# Patient Record
Sex: FEMALE | Race: WHITE | NOT HISPANIC OR LATINO | Employment: FULL TIME | URBAN - METROPOLITAN AREA
[De-identification: names, ages, dates, MRNs, and addresses within clinical notes are randomized per-mention and may not be internally consistent; named-entity substitution may affect disease eponyms.]

---

## 2017-03-07 RX ORDER — SIMVASTATIN 20 MG
TABLET ORAL
Qty: 90 TABLET | Refills: 0 | Status: SHIPPED | OUTPATIENT
Start: 2017-03-07 | End: 2017-03-27

## 2017-03-23 RX ORDER — CETIRIZINE HYDROCHLORIDE 10 MG/1
TABLET ORAL
Qty: 90 TABLET | Refills: 0 | Status: SHIPPED | OUTPATIENT
Start: 2017-03-23 | End: 2017-06-21 | Stop reason: SDUPTHER

## 2017-03-27 ENCOUNTER — OFFICE VISIT (OUTPATIENT)
Dept: FAMILY MEDICINE CLINIC | Facility: CLINIC | Age: 62
End: 2017-03-27

## 2017-03-27 VITALS
BODY MASS INDEX: 24.92 KG/M2 | SYSTOLIC BLOOD PRESSURE: 126 MMHG | WEIGHT: 132 LBS | TEMPERATURE: 98.3 F | HEIGHT: 61 IN | OXYGEN SATURATION: 98 % | DIASTOLIC BLOOD PRESSURE: 80 MMHG | HEART RATE: 96 BPM

## 2017-03-27 DIAGNOSIS — M85.80 OSTEOPENIA: ICD-10-CM

## 2017-03-27 DIAGNOSIS — E78.00 PURE HYPERCHOLESTEROLEMIA: ICD-10-CM

## 2017-03-27 DIAGNOSIS — Z00.00 PHYSICAL EXAM: Primary | ICD-10-CM

## 2017-03-27 DIAGNOSIS — Z13.820 SCREENING FOR OSTEOPOROSIS: ICD-10-CM

## 2017-03-27 LAB
25(OH)D3 SERPL-MCNC: 31.4 NG/ML
ARTICHOKE IGE QN: 155 MG/DL (ref 0–130)
BILIRUB BLD-MCNC: NEGATIVE MG/DL
CHOLEST SERPL-MCNC: 243 MG/DL (ref 0–200)
CLARITY, POC: CLEAR
COLOR UR: YELLOW
GLUCOSE UR STRIP-MCNC: NEGATIVE MG/DL
HDLC SERPL-MCNC: 73 MG/DL (ref 40–60)
KETONES UR QL: NEGATIVE
LEUKOCYTE EST, POC: NEGATIVE
NITRITE UR-MCNC: NEGATIVE MG/ML
PH UR: 7 [PH] (ref 5–8)
PROT UR STRIP-MCNC: NEGATIVE MG/DL
RBC # UR STRIP: NEGATIVE /UL
SP GR UR: 1.01 (ref 1–1.03)
TRIGL SERPL-MCNC: 95 MG/DL (ref 0–150)
UROBILINOGEN UR QL: NORMAL

## 2017-03-27 PROCEDURE — 81003 URINALYSIS AUTO W/O SCOPE: CPT | Performed by: FAMILY MEDICINE

## 2017-03-27 PROCEDURE — 99396 PREV VISIT EST AGE 40-64: CPT | Performed by: FAMILY MEDICINE

## 2017-03-27 PROCEDURE — 36415 COLL VENOUS BLD VENIPUNCTURE: CPT | Performed by: FAMILY MEDICINE

## 2017-03-27 PROCEDURE — 82306 VITAMIN D 25 HYDROXY: CPT | Performed by: FAMILY MEDICINE

## 2017-03-27 PROCEDURE — 80061 LIPID PANEL: CPT | Performed by: FAMILY MEDICINE

## 2017-03-27 RX ORDER — UBIDECARENONE 100 MG
100 CAPSULE ORAL DAILY
COMMUNITY
End: 2018-10-25 | Stop reason: SDUPTHER

## 2017-03-27 NOTE — PROGRESS NOTES
Subjective   Ester Hernandez is a 61 y.o. female.     History of Present Illness   The patient is here for a physical exam.    She states she is doing well.  Denies any chest pain or shortness of breath.    She is employed.  Does not smoke.  Denies alcohol or drug use.    Sees her eye doctor annually.  Sees her dentist twice a year.    Sees a GYN for pap and mammograms.  Sees Dr Raza for Dermatology.    Colonoscopy in 2013 with Dr Arellano. Diverticulosis and to repeat next year.    Immunizations are up to date.      The following portions of the patient's history were reviewed and updated as appropriate: allergies, current medications, past family history, past medical history, past social history, past surgical history and problem list.    Review of Systems   Constitutional: Negative.    HENT: Negative.    Respiratory: Negative.    Cardiovascular: Negative.    Gastrointestinal: Negative.    Endocrine: Negative.    Genitourinary: Negative.    Musculoskeletal: Negative.    Skin: Negative.    Allergic/Immunologic: Positive for environmental allergies (seasonal). Negative for food allergies and immunocompromised state.   Neurological: Negative.    Hematological: Negative.    Psychiatric/Behavioral: Positive for sleep disturbance (insomnia). Negative for agitation, behavioral problems, confusion, decreased concentration, dysphoric mood, hallucinations, self-injury and suicidal ideas. The patient is not nervous/anxious and is not hyperactive.        Objective   Physical Exam   Constitutional: She is oriented to person, place, and time. She appears well-developed and well-nourished. No distress.   HENT:   Head: Normocephalic and atraumatic.   Right Ear: External ear normal.   Left Ear: External ear normal.   Nose: Nose normal.   Mouth/Throat: Oropharynx is clear and moist.   Eyes: Conjunctivae and EOM are normal. Pupils are equal, round, and reactive to light.   Neck: Normal range of motion. Neck supple. No thyromegaly  present.   Cardiovascular: Normal rate, regular rhythm, normal heart sounds and intact distal pulses.    Pulmonary/Chest: Effort normal and breath sounds normal. No respiratory distress. She has no wheezes. She has no rales.   Abdominal: Soft. Bowel sounds are normal. There is no tenderness.   Musculoskeletal: Normal range of motion.   Neurological: She is alert and oriented to person, place, and time. She has normal reflexes.   Skin: Skin is warm and dry. She is not diaphoretic.   Psychiatric: She has a normal mood and affect. Her behavior is normal. Judgment and thought content normal.   Nursing note and vitals reviewed.      Assessment/Plan   Ester was seen today for annual exam.    Diagnoses and all orders for this visit:    Physical exam  -     POCT urinalysis dipstick, automated  -     Lipid Panel  -     Vitamin D 25 Hydroxy    Screening for osteoporosis  -     DEXA Bone Density Axial; Future    Pure hypercholesterolemia    Osteopenia  -     Vitamin D 25 Hydroxy  -     DEXA Bone Density Axial; Future                Drink plenty fluids.    Increase the Vitamin D to 2000 IU daily. Discontinue the Caltrate.    Restart the Melatonin 3 mg at bed time.    Continue other medications as doing.    Check a UA, Lipids, and Vitamin D level. Report results by letter. If her cholesterol levels are still very high, then we will start the rosuvastatin 5 mg a day. If they are improved, we may not. Report those results by letter. Should re-restart the rosuvastatin. Return to see me 2 months after that to check lipids and liver. If not, return to see us in 6 months.    Scribed for Dr Judd Aguirre by Margret Jimenez CMA.    I, Judd Aguirre MD, personally performed the services described in this documentation, as scribed by Margret Jimenez in my presence, and is both accurate and complete.      Follow up in 6 months sooner if needed.

## 2017-06-05 RX ORDER — SIMVASTATIN 20 MG
TABLET ORAL
Qty: 90 TABLET | Refills: 0 | Status: SHIPPED | OUTPATIENT
Start: 2017-06-05 | End: 2018-01-11 | Stop reason: ALTCHOICE

## 2017-06-21 RX ORDER — CETIRIZINE HYDROCHLORIDE 10 MG/1
TABLET ORAL
Qty: 90 TABLET | Refills: 0 | Status: SHIPPED | OUTPATIENT
Start: 2017-06-21 | End: 2018-02-19 | Stop reason: SDUPTHER

## 2017-09-21 ENCOUNTER — TRANSCRIBE ORDERS (OUTPATIENT)
Dept: ADMINISTRATIVE | Facility: HOSPITAL | Age: 62
End: 2017-09-21

## 2017-09-21 DIAGNOSIS — Z12.31 VISIT FOR SCREENING MAMMOGRAM: Primary | ICD-10-CM

## 2017-10-09 ENCOUNTER — HOSPITAL ENCOUNTER (OUTPATIENT)
Dept: MAMMOGRAPHY | Facility: HOSPITAL | Age: 62
Discharge: HOME | End: 2017-10-09
Attending: SURGERY | Admitting: SURGERY

## 2017-10-09 DIAGNOSIS — Z12.31 VISIT FOR SCREENING MAMMOGRAM: ICD-10-CM

## 2017-10-09 PROCEDURE — 77067 SCR MAMMO BI INCL CAD: CPT | Performed by: RADIOLOGY

## 2017-10-09 PROCEDURE — 77063 BREAST TOMOSYNTHESIS BI: CPT | Performed by: RADIOLOGY

## 2017-10-09 PROCEDURE — 77063 BREAST TOMOSYNTHESIS BI: CPT

## 2017-10-09 PROCEDURE — G0202 SCR MAMMO BI INCL CAD: HCPCS

## 2017-10-18 ENCOUNTER — HOSPITAL ENCOUNTER (OUTPATIENT)
Dept: MAMMOGRAPHY | Facility: HOSPITAL | Age: 62
Discharge: HOME | End: 2017-10-18
Admitting: SURGERY

## 2017-10-18 DIAGNOSIS — R92.8 ABNORMAL MAMMOGRAM: ICD-10-CM

## 2017-10-18 PROCEDURE — G0279 TOMOSYNTHESIS, MAMMO: HCPCS

## 2017-10-18 PROCEDURE — 77065 DX MAMMO INCL CAD UNI: CPT | Performed by: RADIOLOGY

## 2017-10-18 PROCEDURE — G0206 DX MAMMO INCL CAD UNI: HCPCS

## 2017-10-18 PROCEDURE — 77061 BREAST TOMOSYNTHESIS UNI: CPT | Performed by: RADIOLOGY

## 2018-01-03 ENCOUNTER — HOSPITAL ENCOUNTER (EMERGENCY)
Facility: HOSPITAL | Age: 63
Discharge: HOME | End: 2018-01-03
Attending: EMERGENCY MEDICINE | Admitting: EMERGENCY MEDICINE

## 2018-01-03 VITALS
RESPIRATION RATE: 14 BRPM | WEIGHT: 135 LBS | HEART RATE: 66 BPM | OXYGEN SATURATION: 97 % | DIASTOLIC BLOOD PRESSURE: 99 MMHG | TEMPERATURE: 97.8 F | BODY MASS INDEX: 25.49 KG/M2 | HEIGHT: 61 IN | SYSTOLIC BLOOD PRESSURE: 162 MMHG

## 2018-01-03 DIAGNOSIS — I10 HYPERTENSION, UNSPECIFIED TYPE: Primary | ICD-10-CM

## 2018-01-03 LAB
ANION GAP SERPL CALCULATED.3IONS-SCNC: 10 MMOL/L (ref 3–11)
BILIRUB UR QL STRIP: NEGATIVE
BUN BLD-MCNC: 13 MG/DL (ref 9–23)
BUN/CREAT SERPL: 18.6 (ref 7–25)
CALCIUM SPEC-SCNC: 9.3 MG/DL (ref 8.7–10.4)
CHLORIDE SERPL-SCNC: 102 MMOL/L (ref 99–109)
CLARITY UR: CLEAR
CO2 SERPL-SCNC: 30 MMOL/L (ref 20–31)
COLOR UR: YELLOW
CREAT BLD-MCNC: 0.7 MG/DL (ref 0.6–1.3)
GFR SERPL CREATININE-BSD FRML MDRD: 85 ML/MIN/1.73
GLUCOSE BLD-MCNC: 114 MG/DL (ref 70–100)
GLUCOSE UR STRIP-MCNC: NEGATIVE MG/DL
HGB UR QL STRIP.AUTO: NEGATIVE
KETONES UR QL STRIP: NEGATIVE
LEUKOCYTE ESTERASE UR QL STRIP.AUTO: NEGATIVE
NITRITE UR QL STRIP: NEGATIVE
PH UR STRIP.AUTO: 7.5 [PH] (ref 5–8)
POTASSIUM BLD-SCNC: 3.8 MMOL/L (ref 3.5–5.5)
PROT UR QL STRIP: NEGATIVE
SODIUM BLD-SCNC: 142 MMOL/L (ref 132–146)
SP GR UR STRIP: 1.01 (ref 1–1.03)
UROBILINOGEN UR QL STRIP: NORMAL

## 2018-01-03 PROCEDURE — 99283 EMERGENCY DEPT VISIT LOW MDM: CPT

## 2018-01-03 PROCEDURE — 80048 BASIC METABOLIC PNL TOTAL CA: CPT | Performed by: EMERGENCY MEDICINE

## 2018-01-03 PROCEDURE — 81003 URINALYSIS AUTO W/O SCOPE: CPT | Performed by: EMERGENCY MEDICINE

## 2018-01-03 RX ORDER — MULTIVITAMIN WITH IRON
1 TABLET ORAL DAILY
COMMUNITY
End: 2019-01-14

## 2018-01-03 RX ORDER — HYDROCHLOROTHIAZIDE 12.5 MG/1
12.5 TABLET ORAL DAILY
Qty: 15 TABLET | Refills: 0 | Status: SHIPPED | OUTPATIENT
Start: 2018-01-03 | End: 2018-01-11 | Stop reason: SDUPTHER

## 2018-01-03 RX ORDER — HYDROCHLOROTHIAZIDE 12.5 MG/1
12.5 TABLET ORAL DAILY
Status: DISCONTINUED | OUTPATIENT
Start: 2018-01-03 | End: 2018-01-03 | Stop reason: HOSPADM

## 2018-01-03 RX ADMIN — HYDROCHLOROTHIAZIDE 12.5 MG: 12.5 TABLET ORAL at 14:10

## 2018-01-03 NOTE — DISCHARGE INSTRUCTIONS
Follow up with your primary medical doctor in one week. Call to make an appointment.    Immediately return to the emergency department for any new or worsening symptoms.

## 2018-01-03 NOTE — ED PROVIDER NOTES
"Subjective   HPI Comments: 62-year-old female presents for evaluation of elevated blood pressure.  She states that she has a history of elevated blood pressure in the past but is not on any current medications as she prefers a more natural and holistic approach.  She went to her dermatologist earlier this morning to be assessed for basal cell and was noted to be hypertensive with a blood pressure 200/100.  She was advised to follow with her primary care physician after calling their office was unable to be evaluated today.  She went to urgent care and was subsequently sent to the ED for evaluation of her elevated blood pressure.  She is currently asymptomatic and without complaint.  She denies any headache or vision changes.  No chest pain.  No shortness of breath.  She states \"I feel great.\"     Patient is a 62 y.o. female presenting with hypertension.   History provided by:  Patient  Hypertension   Severity:  Mild  Onset quality:  Sudden  Duration: Today.  Timing:  Constant  Progression:  Unchanged  Chronicity:  New  Notable PTA blood pressures:  >200/>100  Context: stress    Context comment:  At Dermatologist for procedure  Relieved by:  None tried  Worsened by:  Nothing  Ineffective treatments:  None tried  Associated symptoms: no chest pain, no fever, no headaches, no palpitations and no shortness of breath    Risk factors: family hx of HTN        Review of Systems   Constitutional: Negative for chills, diaphoresis and fever.   Respiratory: Negative for shortness of breath.    Cardiovascular: Negative for chest pain and palpitations.   Neurological: Negative for headaches.   All other systems reviewed and are negative.      Past Medical History:   Diagnosis Date   • Abdominal pain    • Acute URI    • Breast cancer 2004    right   • Breast cancer    • Cyst of left upper eyelid    • Hx of radiation therapy    • Immunization deficiency    • Radiation 2004    right   • Seasonal allergies    • Sinusitis  "       Allergies   Allergen Reactions   • Amoxicillin    • Azithromycin    • Cephalexin    • Ciprofloxacin    • Clarithromycin    • Doxycycline    • Doxycycline Monohydrate    • Erythromycin    • Levofloxacin    • Loratadine        Past Surgical History:   Procedure Laterality Date   • BREAST BIOPSY Right 2004    stereo bx   • BREAST LUMPECTOMY Right 2004   • BREAST SURGERY     • HYSTERECTOMY  2004       Family History   Problem Relation Age of Onset   • Lung cancer Mother    • Hypertension Father    • Diabetes Father    • COPD Brother        Social History     Social History   • Marital status:      Spouse name: N/A   • Number of children: N/A   • Years of education: N/A     Social History Main Topics   • Smoking status: Never Smoker   • Smokeless tobacco: Never Used   • Alcohol use No   • Drug use: No   • Sexual activity: Yes     Partners: Male     Birth control/ protection: None      Comment:      Other Topics Concern   • Not on file     Social History Narrative         Objective   Physical Exam   Constitutional: She is oriented to person, place, and time. She appears well-developed and well-nourished. No distress.   Well-appearing female in no acute distress   HENT:   Head: Normocephalic and atraumatic.   Mouth/Throat: Oropharynx is clear and moist.   Eyes: EOM are normal. Pupils are equal, round, and reactive to light.   Neck: Normal range of motion. Neck supple.   Cardiovascular: Normal rate, regular rhythm and normal heart sounds.  Exam reveals no gallop and no friction rub.    No murmur heard.  Pulmonary/Chest: Effort normal and breath sounds normal. No respiratory distress. She has no wheezes. She has no rales.   Abdominal: Soft. Bowel sounds are normal. She exhibits no distension and no mass. There is no tenderness. There is no rebound and no guarding.   Musculoskeletal: Normal range of motion. She exhibits no edema.   Neurological: She is alert and oriented to person, place, and time.   Skin:  Skin is warm and dry. No rash noted. She is not diaphoretic. No erythema.   Psychiatric: She has a normal mood and affect. Judgment and thought content normal.   Nursing note and vitals reviewed.      Procedures         ED Course  ED Course   Comment By Time   62-year-old female presents for evaluation of elevated blood pressure.  She states that she has a history of elevated blood pressure but is not on any medications as she prefers a more natural approach and does not like medicines.  She was noted to be hypertensive at a dermatologist appointment earlier today and was advised to follow-up with her primary care physician.  However, after being unable to get in with her PCP today, she went to an urgent care and was advised to come to the ED for evaluation.  On arrival to the ED, patient hypertensive but otherwise well-appearing with unremarkable exam.  She is completely asymptomatic.  We will obtain labs to assess for end organ damage and reassess. Genaro Wu MD 01/03 6963   Labs unrevealing with no evidence of end organ damage.  No indication to emergently lower the patient's blood pressure per [facility] clinical policy on asymptomatic hypertension as her clinical presentation certainly is not consistent with hypertensive emergency.  I had a lengthy discussion with the patient regarding blood pressure management and will start her on low dose of HCTZ.  She will follow-up with her primary care physician within one week and keep close tabs on her blood pressure at home in the interim.  Agreeable with plan and given appropriate return precautions. Genaro Wu MD 01/03 3646     No results found for this or any previous visit (from the past 24 hour(s)).  Note: In addition to lab results from this visit, the labs listed above may include labs taken at another facility or during a different encounter within the last 24 hours. Please correlate lab times with ED admission and discharge times for further  "clarification of the services performed during this visit.    No orders to display     Vitals:    01/03/18 1243 01/03/18 1251   BP: (!) 202/110    BP Location: Left arm    Patient Position: Sitting    Pulse: 120 105   Resp: 16    Temp: 97.8 °F (36.6 °C)    TempSrc: Oral    SpO2: 97%    Weight: 61.2 kg (135 lb)    Height: 154.9 cm (61\")      Medications - No data to display  ECG/EMG Results (last 24 hours)     ** No results found for the last 24 hours. **                      Recent Results (from the past 24 hour(s))   Urinalysis With / Culture If Indicated - Urine, Clean Catch    Collection Time: 01/03/18  1:11 PM   Result Value Ref Range    Color, UA Yellow Yellow, Straw    Appearance, UA Clear Clear    pH, UA 7.5 5.0 - 8.0    Specific Gravity, UA 1.007 1.001 - 1.030    Glucose, UA Negative Negative    Ketones, UA Negative Negative    Bilirubin, UA Negative Negative    Blood, UA Negative Negative    Protein, UA Negative Negative    Leuk Esterase, UA Negative Negative    Nitrite, UA Negative Negative    Urobilinogen, UA 0.2 E.U./dL 0.2 - 1.0 E.U./dL   Basic Metabolic Panel    Collection Time: 01/03/18  1:14 PM   Result Value Ref Range    Glucose 114 (H) 70 - 100 mg/dL    BUN 13 9 - 23 mg/dL    Creatinine 0.70 0.60 - 1.30 mg/dL    Sodium 142 132 - 146 mmol/L    Potassium 3.8 3.5 - 5.5 mmol/L    Chloride 102 99 - 109 mmol/L    CO2 30.0 20.0 - 31.0 mmol/L    Calcium 9.3 8.7 - 10.4 mg/dL    eGFR Non African Amer 85 >60 mL/min/1.73    BUN/Creatinine Ratio 18.6 7.0 - 25.0    Anion Gap 10.0 3.0 - 11.0 mmol/L     Note: In addition to lab results from this visit, the labs listed above may include labs taken at another facility or during a different encounter within the last 24 hours. Please correlate lab times with ED admission and discharge times for further clarification of the services performed during this visit.    No orders to display     Vitals:    01/03/18 1243 01/03/18 1251   BP: (!) 202/110    BP Location: Left arm " "   Patient Position: Sitting    Pulse: 120 105   Resp: 16    Temp: 97.8 °F (36.6 °C)    TempSrc: Oral    SpO2: 97%    Weight: 61.2 kg (135 lb)    Height: 154.9 cm (61\")      Medications   hydrochlorothiazide (HYDRODIURIL) tablet 12.5 mg (not administered)     ECG/EMG Results (last 24 hours)     ** No results found for the last 24 hours. **            [facility]    Final diagnoses:   Hypertension, unspecified type       Documentation assistance provided by olesya CONN.  Information recorded by the scribe was done at my direction and has been verified and validated by me.     Frieda Conn  01/03/18 1257       Genaro Wu MD  01/03/18 4464    "

## 2018-01-11 ENCOUNTER — OFFICE VISIT (OUTPATIENT)
Dept: FAMILY MEDICINE CLINIC | Facility: CLINIC | Age: 63
End: 2018-01-11

## 2018-01-11 VITALS
WEIGHT: 132 LBS | DIASTOLIC BLOOD PRESSURE: 92 MMHG | SYSTOLIC BLOOD PRESSURE: 130 MMHG | HEIGHT: 61 IN | HEART RATE: 83 BPM | TEMPERATURE: 98.6 F | BODY MASS INDEX: 24.92 KG/M2 | OXYGEN SATURATION: 98 %

## 2018-01-11 DIAGNOSIS — I10 ESSENTIAL HYPERTENSION: Primary | ICD-10-CM

## 2018-01-11 DIAGNOSIS — Z23 IMMUNIZATION DUE: ICD-10-CM

## 2018-01-11 PROCEDURE — 99213 OFFICE O/P EST LOW 20 MIN: CPT | Performed by: FAMILY MEDICINE

## 2018-01-11 RX ORDER — HYDROCHLOROTHIAZIDE 12.5 MG/1
12.5 TABLET ORAL DAILY
Qty: 30 TABLET | Refills: 5 | Status: SHIPPED | OUTPATIENT
Start: 2018-01-11 | End: 2018-04-16

## 2018-01-11 NOTE — PROGRESS NOTES
"Subjective   Ester Hernandez is a 62 y.o. female.     History of Present Illness   The patient is here for a follow up on Hypertension.    States she is doing well.  Denies any chest pain or shortness of breath.    BP today is 130/92. 142/95 sitting and 144/98 standing on my recheck.  Taking Hctz 12.5 mg daily.    States she went to her dermatologist earlier this morning to be assessed for basal cell and was noted to be hypertensive with a blood pressure 200/100.   She went to the ER and it was 202/110. While waiting it came down to 170/90.  She was given Hctz and it came down even more and she was discharged home.        The following portions of the patient's history were reviewed and updated as appropriate: allergies, current medications, past social history and problem list.    Review of Systems   Respiratory: Negative for shortness of breath.    Cardiovascular: Negative for chest pain.       Objective   /92  Pulse 83  Temp 98.6 °F (37 °C)  Ht 154.9 cm (60.98\")  Wt 59.9 kg (132 lb)  SpO2 98%  BMI 24.95 kg/m2  Physical Exam   Constitutional: She is oriented to person, place, and time. She appears well-developed and well-nourished.   HENT:   Head: Normocephalic.   Eyes: Conjunctivae and EOM are normal. Pupils are equal, round, and reactive to light.   Neck: Normal range of motion. Neck supple. No thyromegaly present.   Cardiovascular: Normal rate, regular rhythm and normal heart sounds.    No murmur heard.  Pulmonary/Chest: Effort normal and breath sounds normal. She has no wheezes. She has no rales.   Musculoskeletal: Normal range of motion.   Lymphadenopathy:     She has no cervical adenopathy.   Neurological: She is alert and oriented to person, place, and time.   Skin: Skin is warm and dry.   Nursing note and vitals reviewed.      Assessment/Plan   Problem List Items Addressed This Visit        Cardiovascular and Mediastinum    Essential hypertension - Primary      Other Visit Diagnoses     " Immunization due                  Drink plenty fluids.    Continue medications as doing.    Monitor BP at home and bring the cuff and readings to next visit.    Given a flu vaccine.    Follow up in 4 weeks.          Scribed for Dr Judd Aguirre by Margret Jimenez CMA.          I, Judd Aguirre MD, personally performed the services described in this documentation, as scribed by Margret Jimenez in my presence, and is both accurate and complete.

## 2018-01-15 PROCEDURE — 90686 IIV4 VACC NO PRSV 0.5 ML IM: CPT | Performed by: FAMILY MEDICINE

## 2018-01-15 PROCEDURE — 90471 IMMUNIZATION ADMIN: CPT | Performed by: FAMILY MEDICINE

## 2018-01-25 ENCOUNTER — OFFICE VISIT (OUTPATIENT)
Dept: FAMILY MEDICINE CLINIC | Facility: CLINIC | Age: 63
End: 2018-01-25

## 2018-01-25 VITALS
SYSTOLIC BLOOD PRESSURE: 134 MMHG | HEART RATE: 84 BPM | DIASTOLIC BLOOD PRESSURE: 86 MMHG | OXYGEN SATURATION: 98 % | TEMPERATURE: 98.8 F | BODY MASS INDEX: 25.3 KG/M2 | HEIGHT: 61 IN | WEIGHT: 134 LBS

## 2018-01-25 DIAGNOSIS — G47.00 INSOMNIA, UNSPECIFIED TYPE: ICD-10-CM

## 2018-01-25 DIAGNOSIS — I10 ESSENTIAL HYPERTENSION: Primary | ICD-10-CM

## 2018-01-25 PROCEDURE — 99214 OFFICE O/P EST MOD 30 MIN: CPT | Performed by: FAMILY MEDICINE

## 2018-01-25 RX ORDER — VENLAFAXINE HYDROCHLORIDE 37.5 MG/1
37.5 TABLET, EXTENDED RELEASE ORAL
Qty: 30 EACH | Refills: 5 | Status: SHIPPED | OUTPATIENT
Start: 2018-01-25 | End: 2018-04-16

## 2018-01-25 RX ORDER — VENLAFAXINE HYDROCHLORIDE 37.5 MG/1
37.5 TABLET, EXTENDED RELEASE ORAL
Qty: 30 EACH | Refills: 5 | Status: SHIPPED | OUTPATIENT
Start: 2018-01-25 | End: 2018-01-25 | Stop reason: SDUPTHER

## 2018-01-25 NOTE — PROGRESS NOTES
"Subjective   Ester Hernandez is a 62 y.o. female.     History of Present Illness   The patient is here for a follow up on Hypertension and Insomnia.    She states she is doing well. Still not sleeping very well.  Denies any chest pain or shortness of breath.    BP today is 136/86. 162/98 on my recheck and 136/88 with her cuff.  Taking Hctz 12.5 mg daily.    States she had a follow up with the Dermatologist and her BP was elevated. States they gave her some Propranolol and it came down.      The following portions of the patient's history were reviewed and updated as appropriate: allergies, current medications, past social history and problem list.    Review of Systems   Respiratory: Negative for shortness of breath.    Cardiovascular: Negative for chest pain.   Psychiatric/Behavioral: Positive for sleep disturbance (Insomnia).       Objective   /86  Pulse 84  Temp 98.8 °F (37.1 °C)  Ht 154.9 cm (60.98\")  Wt 60.8 kg (134 lb)  SpO2 98%  BMI 25.33 kg/m2  Physical Exam   Constitutional: She appears well-developed and well-nourished.   Cardiovascular: Normal rate and regular rhythm.    Pulmonary/Chest: Effort normal and breath sounds normal.   Nursing note and vitals reviewed.      Assessment/Plan   Problem List Items Addressed This Visit        Cardiovascular and Mediastinum    Essential hypertension - Primary       Other    Insomnia              Drink plenty fluids.    Add Venlafaxine ER 37.5 mg daily #30+5.    Continue other medications as doing.    Follow up in April for a physical exam..                I, Judd Aguirre MD, personally performed the services described in this documentation, as scribed by Margret Jimenez in my presence, and is both accurate and complete.        Scribed for Dr Judd Aguirre by Margret Jimenez CMA.    "

## 2018-02-09 ENCOUNTER — TELEPHONE (OUTPATIENT)
Dept: FAMILY MEDICINE CLINIC | Facility: CLINIC | Age: 63
End: 2018-02-09

## 2018-02-09 DIAGNOSIS — G47.00 INSOMNIA, UNSPECIFIED TYPE: Primary | ICD-10-CM

## 2018-02-09 PROBLEM — Z72.820 SLEEP DEPRIVATION: Status: ACTIVE | Noted: 2018-02-09

## 2018-02-09 RX ORDER — TRAZODONE HYDROCHLORIDE 50 MG/1
50 TABLET ORAL NIGHTLY
Qty: 30 TABLET | Refills: 1 | Status: SHIPPED | OUTPATIENT
Start: 2018-02-09 | End: 2018-02-23 | Stop reason: SDUPTHER

## 2018-02-09 NOTE — TELEPHONE ENCOUNTER
I talked to the patient on the telephone.  She is not resting well at night and is experiencing elevation of her blood pressure in the morning.  I want her to start taking trazodone 50 mg each night.  We will call in a prescription.  In addition I wondered to change the timing of her venlafaxine from 37.5 mg at night to 37.5 mg each morning.  Stay on the hydrochlorothiazide at one each morning as doing.  Plan on returning to see us here in the next 1 or 2 weeks during the month of February to recheck her blood pressure and also her rest.

## 2018-02-09 NOTE — TELEPHONE ENCOUNTER
Per dr richardson call in rx for trazadone 50 mg 1 tab qhs for sleep #30 + 1. Pt has been notified.  ----- Message from Leanne Guajardo Rep sent at 2/9/2018  8:10 AM EST -----  Regarding: RX QUESTION  Contact: [phone]  PT WOULD LIKE TO SPEAK TO YOU ABOUT ONE OF THE RX SHE IS TAKING. SHE DOESN'T THINK IT IS WORKING AND WOULD LIKE TO DISCUSS OTHER OPTIONS.

## 2018-02-19 RX ORDER — CETIRIZINE HYDROCHLORIDE 10 MG/1
10 TABLET ORAL DAILY
Qty: 90 TABLET | Refills: 1 | Status: SHIPPED | OUTPATIENT
Start: 2018-02-19 | End: 2018-02-19 | Stop reason: SDUPTHER

## 2018-02-19 RX ORDER — CETIRIZINE HYDROCHLORIDE 10 MG/1
TABLET ORAL
Qty: 90 TABLET | Refills: 0 | Status: SHIPPED | OUTPATIENT
Start: 2018-02-19 | End: 2018-02-22 | Stop reason: SDUPTHER

## 2018-02-22 ENCOUNTER — TELEPHONE (OUTPATIENT)
Dept: FAMILY MEDICINE CLINIC | Facility: CLINIC | Age: 63
End: 2018-02-22

## 2018-02-22 RX ORDER — CETIRIZINE HYDROCHLORIDE 10 MG/1
10 TABLET ORAL DAILY
Qty: 90 TABLET | Refills: 0 | Status: SHIPPED | OUTPATIENT
Start: 2018-02-22 | End: 2020-03-11 | Stop reason: SDUPTHER

## 2018-02-23 ENCOUNTER — OFFICE VISIT (OUTPATIENT)
Dept: FAMILY MEDICINE CLINIC | Facility: CLINIC | Age: 63
End: 2018-02-23

## 2018-02-23 VITALS
DIASTOLIC BLOOD PRESSURE: 74 MMHG | OXYGEN SATURATION: 95 % | WEIGHT: 135 LBS | TEMPERATURE: 98.3 F | HEIGHT: 61 IN | SYSTOLIC BLOOD PRESSURE: 124 MMHG | HEART RATE: 88 BPM | BODY MASS INDEX: 25.49 KG/M2

## 2018-02-23 DIAGNOSIS — G47.00 INSOMNIA, UNSPECIFIED TYPE: ICD-10-CM

## 2018-02-23 DIAGNOSIS — I10 ESSENTIAL HYPERTENSION: Primary | ICD-10-CM

## 2018-02-23 PROCEDURE — 99213 OFFICE O/P EST LOW 20 MIN: CPT | Performed by: FAMILY MEDICINE

## 2018-02-23 RX ORDER — TRAZODONE HYDROCHLORIDE 50 MG/1
100 TABLET ORAL NIGHTLY
Qty: 60 TABLET | Refills: 5 | Status: SHIPPED | OUTPATIENT
Start: 2018-02-23 | End: 2018-11-30 | Stop reason: SINTOL

## 2018-02-23 NOTE — PROGRESS NOTES
"Subjective   Ester Hernandez is a 62 y.o. female.     History of Present Illness   The patient is here for a follow up on Hypertension.    He states he is doing well.  Denies any chest pain or shortness of breath.    BP today is 124/74.  Taking Hctz 12.5 mg daily.    Still not sleeping through the night.      The following portions of the patient's history were reviewed and updated as appropriate: allergies, current medications, past social history and problem list.    Review of Systems   Respiratory: Negative for shortness of breath.    Cardiovascular: Negative for chest pain.   Psychiatric/Behavioral: Positive for sleep disturbance.       Objective   /74  Pulse 88  Temp 98.3 °F (36.8 °C)  Ht 154.9 cm (60.98\")  Wt 61.2 kg (135 lb)  SpO2 95%  BMI 25.52 kg/m2  Physical Exam   Constitutional: She is oriented to person, place, and time. She appears well-developed and well-nourished.   HENT:   Mouth/Throat: Oropharynx is clear and moist.   Eyes: Pupils are equal, round, and reactive to light.   Neck: Normal range of motion. No thyromegaly present.   Cardiovascular: Normal rate, regular rhythm and normal heart sounds.    No murmur heard.  Pulmonary/Chest: Effort normal and breath sounds normal. She has no wheezes. She has no rales.   Neurological: She is alert and oriented to person, place, and time.   Psychiatric: She has a normal mood and affect.   Nursing note and vitals reviewed.      Assessment/Plan   Problem List Items Addressed This Visit        Cardiovascular and Mediastinum    Essential hypertension - Primary       Other    Insomnia              Drink plenty fluids.    Increase trazodone 50 mg to 2 at bedtime. #60+5.    Continue other medications as doing.    Follow up in April for a physical exam.      Scribed for Dr Judd Aguirre by Margret Jimenez CMA.          I, Judd Aguirre MD, personally performed the services described in this documentation, as scribed by Margret Jimneez in my presence, and is " both accurate and complete.

## 2018-04-04 ENCOUNTER — TELEPHONE (OUTPATIENT)
Dept: FAMILY MEDICINE CLINIC | Facility: CLINIC | Age: 63
End: 2018-04-04

## 2018-04-16 ENCOUNTER — OFFICE VISIT (OUTPATIENT)
Dept: FAMILY MEDICINE CLINIC | Facility: CLINIC | Age: 63
End: 2018-04-16

## 2018-04-16 VITALS
TEMPERATURE: 94.7 F | HEART RATE: 81 BPM | OXYGEN SATURATION: 96 % | HEIGHT: 60 IN | WEIGHT: 134 LBS | BODY MASS INDEX: 26.31 KG/M2 | RESPIRATION RATE: 20 BRPM | SYSTOLIC BLOOD PRESSURE: 124 MMHG | DIASTOLIC BLOOD PRESSURE: 84 MMHG

## 2018-04-16 DIAGNOSIS — M85.88 OSTEOPENIA OF SPINE: ICD-10-CM

## 2018-04-16 DIAGNOSIS — I10 ESSENTIAL HYPERTENSION: ICD-10-CM

## 2018-04-16 DIAGNOSIS — Z00.00 WELL ADULT EXAM: Primary | ICD-10-CM

## 2018-04-16 LAB
25(OH)D3 SERPL-MCNC: 27 NG/ML
ALBUMIN SERPL-MCNC: 4.4 G/DL (ref 3.2–4.8)
ALBUMIN/GLOB SERPL: 1.8 G/DL (ref 1.5–2.5)
ALP SERPL-CCNC: 58 U/L (ref 25–100)
ALT SERPL W P-5'-P-CCNC: 18 U/L (ref 7–40)
ANION GAP SERPL CALCULATED.3IONS-SCNC: 5 MMOL/L (ref 3–11)
ARTICHOKE IGE QN: 137 MG/DL (ref 0–130)
AST SERPL-CCNC: 25 U/L (ref 0–33)
BASOPHILS # BLD AUTO: 0.02 10*3/MM3 (ref 0–0.2)
BASOPHILS NFR BLD AUTO: 0.3 % (ref 0–1)
BILIRUB BLD-MCNC: NEGATIVE MG/DL
BILIRUB SERPL-MCNC: 0.4 MG/DL (ref 0.3–1.2)
BUN BLD-MCNC: 16 MG/DL (ref 9–23)
BUN/CREAT SERPL: 22.9 (ref 7–25)
CALCIUM SPEC-SCNC: 9.2 MG/DL (ref 8.7–10.4)
CHLORIDE SERPL-SCNC: 100 MMOL/L (ref 99–109)
CHOLEST SERPL-MCNC: 218 MG/DL (ref 0–200)
CLARITY, POC: CLEAR
CO2 SERPL-SCNC: 32 MMOL/L (ref 20–31)
COLOR UR: YELLOW
CREAT BLD-MCNC: 0.7 MG/DL (ref 0.6–1.3)
DEPRECATED RDW RBC AUTO: 44.2 FL (ref 37–54)
EOSINOPHIL # BLD AUTO: 0.04 10*3/MM3 (ref 0–0.3)
EOSINOPHIL NFR BLD AUTO: 0.6 % (ref 0–3)
ERYTHROCYTE [DISTWIDTH] IN BLOOD BY AUTOMATED COUNT: 13.5 % (ref 11.3–14.5)
EXPIRATION DATE: ABNORMAL
GFR SERPL CREATININE-BSD FRML MDRD: 85 ML/MIN/1.73
GLOBULIN UR ELPH-MCNC: 2.5 GM/DL
GLUCOSE BLD-MCNC: 87 MG/DL (ref 70–100)
GLUCOSE UR STRIP-MCNC: NEGATIVE MG/DL
HCT VFR BLD AUTO: 43.3 % (ref 34.5–44)
HDLC SERPL-MCNC: 71 MG/DL (ref 40–60)
HGB BLD-MCNC: 13.9 G/DL (ref 11.5–15.5)
IMM GRANULOCYTES # BLD: 0.01 10*3/MM3 (ref 0–0.03)
IMM GRANULOCYTES NFR BLD: 0.2 % (ref 0–0.6)
KETONES UR QL: NEGATIVE
LEUKOCYTE EST, POC: NEGATIVE
LYMPHOCYTES # BLD AUTO: 1.4 10*3/MM3 (ref 0.6–4.8)
LYMPHOCYTES NFR BLD AUTO: 22.5 % (ref 24–44)
Lab: ABNORMAL
MCH RBC QN AUTO: 28.5 PG (ref 27–31)
MCHC RBC AUTO-ENTMCNC: 32.1 G/DL (ref 32–36)
MCV RBC AUTO: 88.9 FL (ref 80–99)
MONOCYTES # BLD AUTO: 0.27 10*3/MM3 (ref 0–1)
MONOCYTES NFR BLD AUTO: 4.3 % (ref 0–12)
NEUTROPHILS # BLD AUTO: 4.5 10*3/MM3 (ref 1.5–8.3)
NEUTROPHILS NFR BLD AUTO: 72.3 % (ref 41–71)
NITRITE UR-MCNC: NEGATIVE MG/ML
PH UR: 7 [PH] (ref 5–8)
PLATELET # BLD AUTO: 257 10*3/MM3 (ref 150–450)
PMV BLD AUTO: 11.2 FL (ref 6–12)
POTASSIUM BLD-SCNC: 3.9 MMOL/L (ref 3.5–5.5)
PROT SERPL-MCNC: 6.9 G/DL (ref 5.7–8.2)
PROT UR STRIP-MCNC: NEGATIVE MG/DL
RBC # BLD AUTO: 4.87 10*6/MM3 (ref 3.89–5.14)
RBC # UR STRIP: ABNORMAL /UL
SODIUM BLD-SCNC: 137 MMOL/L (ref 132–146)
SP GR UR: 1.01 (ref 1–1.03)
TRIGL SERPL-MCNC: 71 MG/DL (ref 0–150)
TSH SERPL DL<=0.05 MIU/L-ACNC: 1.86 MIU/ML (ref 0.35–5.35)
UROBILINOGEN UR QL: NORMAL
WBC NRBC COR # BLD: 6.23 10*3/MM3 (ref 3.5–10.8)

## 2018-04-16 PROCEDURE — 81003 URINALYSIS AUTO W/O SCOPE: CPT | Performed by: FAMILY MEDICINE

## 2018-04-16 PROCEDURE — 84443 ASSAY THYROID STIM HORMONE: CPT | Performed by: FAMILY MEDICINE

## 2018-04-16 PROCEDURE — 80053 COMPREHEN METABOLIC PANEL: CPT | Performed by: FAMILY MEDICINE

## 2018-04-16 PROCEDURE — 82306 VITAMIN D 25 HYDROXY: CPT | Performed by: FAMILY MEDICINE

## 2018-04-16 PROCEDURE — 36415 COLL VENOUS BLD VENIPUNCTURE: CPT | Performed by: FAMILY MEDICINE

## 2018-04-16 PROCEDURE — 93000 ELECTROCARDIOGRAM COMPLETE: CPT | Performed by: FAMILY MEDICINE

## 2018-04-16 PROCEDURE — 85025 COMPLETE CBC W/AUTO DIFF WBC: CPT | Performed by: FAMILY MEDICINE

## 2018-04-16 PROCEDURE — 80061 LIPID PANEL: CPT | Performed by: FAMILY MEDICINE

## 2018-04-16 PROCEDURE — 99396 PREV VISIT EST AGE 40-64: CPT | Performed by: FAMILY MEDICINE

## 2018-04-16 NOTE — PROGRESS NOTES
"Subjective   Ester Hernandez is a 62 y.o. female.     History of Present Illness   The patient is here for a physical exam.    States she is doing well.  Denies any chest pain or shortness of breath.    She is employed.  Does not smoke.  Denies alcohol or drug use.    Sees an eye doctor yearly  Sees a dentist twice a year.  Sees Dermatology.  Had a seborrheic keratosis removed from the left breast.    Last colonoscopy 05/16/2013 with Dr Arellano. To repeat this year.    Immunizations are up to date.      The following portions of the patient's history were reviewed and updated as appropriate: allergies, current medications, past social history and problem list.    Review of Systems   Constitutional: Negative.    HENT: Negative.    Eyes: Negative.    Respiratory: Negative.    Cardiovascular: Negative.    Gastrointestinal: Positive for constipation (occasional). Negative for abdominal distention, abdominal pain, anal bleeding, blood in stool, diarrhea, nausea, rectal pain and vomiting.   Endocrine: Negative.    Genitourinary: Negative.    Musculoskeletal: Negative.    Skin: Negative.         Removal of a seborrheic keratosis of the left breast.     Allergic/Immunologic: Positive for environmental allergies (seasonal). Negative for food allergies and immunocompromised state.   Neurological: Negative.    Hematological: Negative.    Psychiatric/Behavioral: Negative.        ECG 12 Lead  Date/Time: 4/22/2018 3:26 PM  Performed by: QUENTIN REDDY  Authorized by: QUENTIN REDDY   Comparison: not compared with previous ECG   Rhythm: sinus rhythm  Rate: normal  BPM: 62  Conduction: conduction normal  ST Segments: ST segments normal  T Waves: T waves normal  QRS axis: normal  Other: no other findings  Clinical impression: normal ECG            Objective   /84   Pulse 81   Temp 94.7 °F (34.8 °C) (Oral)   Resp 20   Ht 152.4 cm (60\")   Wt 60.8 kg (134 lb)   SpO2 96%   BMI 26.17 kg/m²   Physical Exam   Constitutional: " She is oriented to person, place, and time. She appears well-developed and well-nourished. No distress.   HENT:   Head: Normocephalic and atraumatic.   Right Ear: External ear normal.   Left Ear: External ear normal.   Nose: Nose normal.   Mouth/Throat: Oropharynx is clear and moist.   Eyes: Conjunctivae and EOM are normal. Pupils are equal, round, and reactive to light.   Neck: Normal range of motion. Neck supple. No thyromegaly present.   Cardiovascular: Normal rate, regular rhythm, normal heart sounds and intact distal pulses.    Pulmonary/Chest: Effort normal and breath sounds normal. No respiratory distress. She has no wheezes. She has no rales.   Abdominal: Soft. Bowel sounds are normal. There is no tenderness.   Musculoskeletal: Normal range of motion.   Neurological: She is alert and oriented to person, place, and time. She has normal reflexes.   Skin: Skin is warm and dry. She is not diaphoretic.   Psychiatric: She has a normal mood and affect. Her behavior is normal. Judgment and thought content normal.   Nursing note and vitals reviewed.      Assessment/Plan   Problem List Items Addressed This Visit        Cardiovascular and Mediastinum    Essential hypertension       Musculoskeletal and Integument    Osteopenia      Other Visit Diagnoses     Well adult exam    -  Primary    Relevant Orders    POCT urinalysis dipstick, automated (Completed)              Drink plenty fluids.    Increase the Trazodone to 100 mg at night.    Stay off the Venlafaxine and Hctz for now. Restart if needed.    Continue other medications as doing.    Check a UA,CBC,CMP,Lipids,Vitamin D, and TSH. Report results by letter.    Schedule for a bone density. Report results by letter.    Follow up in 1 year. Sooner if needed.        We discussed with the patient the importance of maintaining a healthy weight by observing a diet that is low in carbohydrates.  We also recommended avoiding consumption of high levels of sodium and caffeine  to prevent hypertension. We recommended daily exercise and obtaining a weight with a BMI less than 26.  We also recommended avoiding the use of tobacco and alcohol.  We also recommended an annual physical with their primary care physician.            Scribed for Dr Judd Aguirre by Margret Jimenez CMA.          I, Judd Aguirre MD, personally performed the services described in this documentation, as scribed by Margret Jmienez in my presence, and is both accurate and complete.

## 2018-05-01 ENCOUNTER — APPOINTMENT (OUTPATIENT)
Dept: BONE DENSITY | Facility: HOSPITAL | Age: 63
End: 2018-05-01
Attending: FAMILY MEDICINE

## 2018-05-07 ENCOUNTER — TELEPHONE (OUTPATIENT)
Dept: FAMILY MEDICINE CLINIC | Facility: CLINIC | Age: 63
End: 2018-05-07

## 2018-05-15 ENCOUNTER — TELEPHONE (OUTPATIENT)
Dept: FAMILY MEDICINE CLINIC | Facility: CLINIC | Age: 63
End: 2018-05-15

## 2018-05-15 NOTE — TELEPHONE ENCOUNTER
Patient states she has no received results via mail  ----- Message from Judd Aguirre MD sent at 5/15/2018  9:25 AM EDT -----  Regarding: RE: PHYSICAL LAB RESULTS  Contact: [phone]  Dear Maria R,    I did compose a letter and I believe it was mailed to the patient regarding her laboratory results.  I will bring you a copy of that letter and please contact the patient and see if she is received the letter yet.  If not please mail a copy that I have given you.  Dr. Aguirre  ----- Message -----  From: Maria R Valle MA  Sent: 5/7/2018   1:39 PM  To: Judd Aguirre MD  Subject: FW: PHYSICAL LAB RESULTS                             ----- Message -----  From: Nubia Calderon  Sent: 5/4/2018   5:30 PM  To: Maria R Valle MA, Monse Langley MA  Subject: PHYSICAL LAB RESULTS                             PT CALLED IN TO SAY SHE NEVER RECEIVED LAB RESULTS FROM PHYSICAL. PLEASE CALL WHEN TIME PERMITS.  THANKS,  NUBIA

## 2018-06-06 ENCOUNTER — TELEPHONE (OUTPATIENT)
Dept: FAMILY MEDICINE CLINIC | Facility: CLINIC | Age: 63
End: 2018-06-06

## 2018-06-06 NOTE — TELEPHONE ENCOUNTER
Pt is coming to  her list.    ----- Message from Tricia Izaguirre sent at 6/4/2018  5:25 PM EDT -----  Regarding: note  Contact: [phone]  Patient states  usually writes up a paper stating what all meds she is on and that he recommends she takes them on a script for her reimbursement claims. States the most recent CPX she had done she did not get this. Can  please write this and patient will come by and get it.

## 2018-08-27 ENCOUNTER — TRANSCRIBE ORDERS (OUTPATIENT)
Dept: ADMINISTRATIVE | Facility: HOSPITAL | Age: 63
End: 2018-08-27

## 2018-08-27 DIAGNOSIS — Z12.31 VISIT FOR SCREENING MAMMOGRAM: Primary | ICD-10-CM

## 2018-10-25 ENCOUNTER — OFFICE VISIT (OUTPATIENT)
Dept: FAMILY MEDICINE CLINIC | Facility: CLINIC | Age: 63
End: 2018-10-25

## 2018-10-25 VITALS
TEMPERATURE: 98.3 F | WEIGHT: 132.8 LBS | RESPIRATION RATE: 18 BRPM | HEART RATE: 51 BPM | OXYGEN SATURATION: 97 % | DIASTOLIC BLOOD PRESSURE: 80 MMHG | HEIGHT: 60 IN | BODY MASS INDEX: 26.07 KG/M2 | SYSTOLIC BLOOD PRESSURE: 120 MMHG

## 2018-10-25 DIAGNOSIS — J06.9 ACUTE URI: Primary | ICD-10-CM

## 2018-10-25 DIAGNOSIS — Z48.02 ENCOUNTER FOR REMOVAL OF SUTURES: ICD-10-CM

## 2018-10-25 PROCEDURE — 99213 OFFICE O/P EST LOW 20 MIN: CPT | Performed by: FAMILY MEDICINE

## 2018-10-25 RX ORDER — CLINDAMYCIN HYDROCHLORIDE 300 MG/1
300 CAPSULE ORAL 2 TIMES DAILY
Qty: 10 CAPSULE | Refills: 0 | Status: SHIPPED | OUTPATIENT
Start: 2018-10-25 | End: 2018-12-07

## 2018-10-25 RX ORDER — MELATONIN
1000 DAILY
Qty: 30 TABLET | Refills: 11 | Status: SHIPPED | OUTPATIENT
Start: 2018-10-25 | End: 2019-12-10 | Stop reason: SDUPTHER

## 2018-10-25 RX ORDER — FLUTICASONE PROPIONATE 50 MCG
2 SPRAY, SUSPENSION (ML) NASAL DAILY
Qty: 1 BOTTLE | Refills: 2 | Status: SHIPPED | OUTPATIENT
Start: 2018-10-25 | End: 2019-01-09

## 2018-10-25 RX ORDER — UBIDECARENONE 100 MG
100 CAPSULE ORAL DAILY
Qty: 30 CAPSULE | Refills: 11 | Status: SHIPPED | OUTPATIENT
Start: 2018-10-25 | End: 2019-12-10 | Stop reason: SDUPTHER

## 2018-10-25 NOTE — PROGRESS NOTES
"Subjective   Ester Hernandez is a 62 y.o. female.     Cough   This is a new problem. The current episode started in the past 7 days. The problem has been unchanged. The cough is non-productive. Associated symptoms include headaches (mild), postnasal drip and a sore throat (mild). Pertinent negatives include no chest pain, fever, shortness of breath or wheezing. The symptoms are aggravated by pollens. She has tried OTC cough suppressant (and Zyrtec) for the symptoms. The treatment provided no relief.      Also states she had an atypical skin lesion removed of the right thigh 2 weeks ago. Wanting to get the stitches removed today.    Also needing a new Rx for Vitamin D and Co Q 10 .      The following portions of the patient's history were reviewed and updated as appropriate: allergies, current medications, past social history and problem list.    Review of Systems   Constitutional: Negative for fever.   HENT: Positive for postnasal drip and sore throat (mild).    Respiratory: Positive for cough. Negative for shortness of breath and wheezing.    Cardiovascular: Negative for chest pain.   Neurological: Positive for headaches (mild).       Objective   /80   Pulse 51   Temp 98.3 °F (36.8 °C) (Temporal Artery )   Resp 18   Ht 152.4 cm (60\")   Wt 60.2 kg (132 lb 12.8 oz)   SpO2 97%   BMI 25.94 kg/m²   Physical Exam   Constitutional: She appears well-developed and well-nourished.   Cardiovascular: Normal rate and regular rhythm.    Pulmonary/Chest: Effort normal and breath sounds normal.   Nursing note and vitals reviewed.      Assessment/Plan   Problem List Items Addressed This Visit     None      Visit Diagnoses     Acute URI    -  Primary    Relevant Medications    clindamycin (CLEOCIN) 300 MG capsule    fluticasone (FLONASE) 50 MCG/ACT nasal spray    Encounter for removal of sutures        Relevant Orders    Suture Removal        Suture Removal  Date/Time: 10/25/2018 11:49 AM  Performed by: QUENTIN REDDY " R  Authorized by: JUDD AGUIRRE   Consent: Verbal consent obtained.  Consent given by: patient  Patient understanding: patient states understanding of the procedure being performed  Patient consent: the patient's understanding of the procedure matches consent given  Patient identity confirmed: verbally with patient  Body area: lower extremity  Location details: right upper leg  Wound Appearance: clean and pink  Patient tolerance: Patient tolerated the procedure well with no immediate complications            Drink plenty fluids.    Continue Zyrtec as doing.    Rx for fluticasone nasal spray 2 sprays in each nostril daily #1+2.  Rx for Clindamycin 300 mg twice a day #10+0.    Increase Trazodone to 2 each night.    Continue other medications as doing.    Follow up as needed.                Scribed for Dr Judd Aguirre by Margret Jimenez CMA.          I, Judd Aguirre MD, personally performed the services described in this documentation, as scribed by Margrte Jimenez in my presence, and is both accurate and complete.

## 2018-11-12 ENCOUNTER — OFFICE VISIT (OUTPATIENT)
Dept: FAMILY MEDICINE CLINIC | Facility: CLINIC | Age: 63
End: 2018-11-12

## 2018-11-12 VITALS
TEMPERATURE: 98 F | RESPIRATION RATE: 20 BRPM | HEIGHT: 60 IN | DIASTOLIC BLOOD PRESSURE: 88 MMHG | HEART RATE: 85 BPM | BODY MASS INDEX: 24.9 KG/M2 | WEIGHT: 126.8 LBS | SYSTOLIC BLOOD PRESSURE: 132 MMHG | OXYGEN SATURATION: 97 %

## 2018-11-12 DIAGNOSIS — I10 ESSENTIAL HYPERTENSION: Primary | ICD-10-CM

## 2018-11-12 DIAGNOSIS — F41.9 ANXIETY: ICD-10-CM

## 2018-11-12 PROCEDURE — 99213 OFFICE O/P EST LOW 20 MIN: CPT | Performed by: FAMILY MEDICINE

## 2018-11-12 RX ORDER — LOSARTAN POTASSIUM 50 MG/1
50 TABLET ORAL DAILY
Qty: 30 TABLET | Refills: 5 | Status: SHIPPED | OUTPATIENT
Start: 2018-11-12 | End: 2018-11-21 | Stop reason: ALTCHOICE

## 2018-11-12 RX ORDER — ALPRAZOLAM 0.5 MG/1
0.5 TABLET ORAL 2 TIMES DAILY PRN
Qty: 30 TABLET | Refills: 0
Start: 2018-11-12 | End: 2018-11-30 | Stop reason: SINTOL

## 2018-11-12 NOTE — PROGRESS NOTES
"Subjective   Ester Hernandez is a 62 y.o. female seen today for Elevated Blood Pressure and Anxiety.     History of Present Illness   The patient is here today for a recheck on Anxiety and Hypertension.    States she is feeling anxious about Mammogram and colonoscopy. Has made her BP spike to 190/130.  States she has had some cough,nausea, decreased appetite, and Insomnia. States she wakes up around 4 am and can't go back to sleep.  Denies any chest pain or shortness of breath.    BP today is 132/88. 170/95 sitting and 180/110 standing on recheck.    The following portions of the patient's history were reviewed and updated as appropriate: allergies, current medications, past social history and problem list.    Review of Systems   Constitutional: Positive for appetite change (decreased) and fatigue.   Respiratory: Positive for cough. Negative for shortness of breath.    Cardiovascular: Negative for chest pain.   Gastrointestinal: Positive for nausea.   Psychiatric/Behavioral: Positive for sleep disturbance. Negative for self-injury and suicidal ideas. The patient is nervous/anxious.        Objective   /88   Pulse 85   Temp 98 °F (36.7 °C) (Temporal)   Resp 20   Ht 152.4 cm (60\")   Wt 57.5 kg (126 lb 12.8 oz)   SpO2 97%   BMI 24.76 kg/m²   Physical Exam   Constitutional: She appears well-developed and well-nourished.   Cardiovascular: Normal rate and regular rhythm.   Pulmonary/Chest: Effort normal and breath sounds normal.   Nursing note and vitals reviewed.      Assessment/Plan   Problem List Items Addressed This Visit        Cardiovascular and Mediastinum    Essential hypertension - Primary    Relevant Medications    losartan (COZAAR) 50 MG tablet      Other Visit Diagnoses     Anxiety        Relevant Medications    ALPRAZolam (XANAX) 0.5 MG tablet              Drink plenty fluids.    Add Losartan 50 mg daily #30+5.    Continue other medications as doing.    Written Rx for Alprazolam 0.5 mg twice daily " as needed 30 +0.  [facility] #[id number] reviewed.    Follow up in 2 weeks. Sooner if needed.                Scribed for Dr Judd Aguirre by Margret Jimenez CMA.          I, Judd Aguirre MD, personally performed the services described in this documentation, as scribed by Margret Jimenez in my presence, and is both accurate and complete.

## 2018-11-21 ENCOUNTER — OFFICE VISIT (OUTPATIENT)
Dept: FAMILY MEDICINE CLINIC | Facility: CLINIC | Age: 63
End: 2018-11-21

## 2018-11-21 VITALS
DIASTOLIC BLOOD PRESSURE: 80 MMHG | OXYGEN SATURATION: 96 % | HEART RATE: 70 BPM | SYSTOLIC BLOOD PRESSURE: 120 MMHG | WEIGHT: 129 LBS | BODY MASS INDEX: 25.32 KG/M2 | RESPIRATION RATE: 20 BRPM | TEMPERATURE: 98.2 F | HEIGHT: 60 IN

## 2018-11-21 DIAGNOSIS — I10 ESSENTIAL HYPERTENSION: ICD-10-CM

## 2018-11-21 DIAGNOSIS — R11.0 DAILY NAUSEA: Primary | ICD-10-CM

## 2018-11-21 LAB
AMYLASE SERPL-CCNC: 68 U/L (ref 30–118)
BASOPHILS # BLD AUTO: 0.02 10*3/MM3 (ref 0–0.2)
BASOPHILS NFR BLD AUTO: 0.3 % (ref 0–1)
BILIRUB BLD-MCNC: NEGATIVE MG/DL
CLARITY, POC: CLEAR
COLOR UR: YELLOW
DEPRECATED RDW RBC AUTO: 44.4 FL (ref 37–54)
EOSINOPHIL # BLD AUTO: 0.05 10*3/MM3 (ref 0–0.3)
EOSINOPHIL NFR BLD AUTO: 0.7 % (ref 0–3)
ERYTHROCYTE [DISTWIDTH] IN BLOOD BY AUTOMATED COUNT: 13.5 % (ref 11.3–14.5)
GLUCOSE UR STRIP-MCNC: NEGATIVE MG/DL
HCT VFR BLD AUTO: 41.8 % (ref 34.5–44)
HGB BLD-MCNC: 13.3 G/DL (ref 11.5–15.5)
IMM GRANULOCYTES # BLD: 0.01 10*3/MM3 (ref 0–0.03)
IMM GRANULOCYTES NFR BLD: 0.1 % (ref 0–0.6)
KETONES UR QL: NEGATIVE
LEUKOCYTE EST, POC: NEGATIVE
LIPASE SERPL-CCNC: 69 U/L (ref 6–51)
LYMPHOCYTES # BLD AUTO: 1.78 10*3/MM3 (ref 0.6–4.8)
LYMPHOCYTES NFR BLD AUTO: 25.8 % (ref 24–44)
MCH RBC QN AUTO: 28.7 PG (ref 27–31)
MCHC RBC AUTO-ENTMCNC: 31.8 G/DL (ref 32–36)
MCV RBC AUTO: 90.3 FL (ref 80–99)
MONOCYTES # BLD AUTO: 0.48 10*3/MM3 (ref 0–1)
MONOCYTES NFR BLD AUTO: 7 % (ref 0–12)
NEUTROPHILS # BLD AUTO: 4.56 10*3/MM3 (ref 1.5–8.3)
NEUTROPHILS NFR BLD AUTO: 66.2 % (ref 41–71)
NITRITE UR-MCNC: NEGATIVE MG/ML
PH UR: 8.5 [PH] (ref 5–8)
PLATELET # BLD AUTO: 253 10*3/MM3 (ref 150–450)
PMV BLD AUTO: 11.6 FL (ref 6–12)
PROT UR STRIP-MCNC: NEGATIVE MG/DL
RBC # BLD AUTO: 4.63 10*6/MM3 (ref 3.89–5.14)
RBC # UR STRIP: NEGATIVE /UL
SP GR UR: 1.01 (ref 1–1.03)
UROBILINOGEN UR QL: NORMAL
WBC NRBC COR # BLD: 6.89 10*3/MM3 (ref 3.5–10.8)

## 2018-11-21 PROCEDURE — 82150 ASSAY OF AMYLASE: CPT | Performed by: FAMILY MEDICINE

## 2018-11-21 PROCEDURE — 83690 ASSAY OF LIPASE: CPT | Performed by: FAMILY MEDICINE

## 2018-11-21 PROCEDURE — 81003 URINALYSIS AUTO W/O SCOPE: CPT | Performed by: FAMILY MEDICINE

## 2018-11-21 PROCEDURE — 85025 COMPLETE CBC W/AUTO DIFF WBC: CPT | Performed by: FAMILY MEDICINE

## 2018-11-21 PROCEDURE — 99214 OFFICE O/P EST MOD 30 MIN: CPT | Performed by: FAMILY MEDICINE

## 2018-11-21 PROCEDURE — 36415 COLL VENOUS BLD VENIPUNCTURE: CPT | Performed by: FAMILY MEDICINE

## 2018-11-21 PROCEDURE — 80053 COMPREHEN METABOLIC PANEL: CPT | Performed by: FAMILY MEDICINE

## 2018-11-21 RX ORDER — ONDANSETRON 4 MG/1
4 TABLET, ORALLY DISINTEGRATING ORAL EVERY 8 HOURS PRN
Qty: 10 TABLET | Refills: 1 | Status: SHIPPED | OUTPATIENT
Start: 2018-11-21 | End: 2019-05-28

## 2018-11-21 RX ORDER — INDAPAMIDE 1.25 MG/1
1.25 TABLET, FILM COATED ORAL EVERY MORNING
Qty: 30 TABLET | Refills: 0 | Status: SHIPPED | OUTPATIENT
Start: 2018-11-21 | End: 2018-12-21 | Stop reason: SDUPTHER

## 2018-11-21 NOTE — PROGRESS NOTES
"Subjective   Ester Hernandez is a 63 y.o. female seen today for Nausea.     Nausea   This is a recurrent problem. The current episode started 1 to 4 weeks ago. Associated symptoms include abdominal pain (cranping) and nausea. Pertinent negatives include no change in bowel habit, chest pain, chills, fever, urinary symptoms or vomiting. Associated symptoms comments: Decreased appetite..        The following portions of the patient's history were reviewed and updated as appropriate: allergies, current medications, past social history and problem list.    Review of Systems   Constitutional: Positive for appetite change (decreased). Negative for chills and fever.   Respiratory: Negative for shortness of breath.    Cardiovascular: Negative for chest pain.   Gastrointestinal: Positive for abdominal pain (cranping) and nausea. Negative for change in bowel habit, constipation, diarrhea and vomiting.   Genitourinary: Negative.        Objective   /80   Pulse 70   Temp 98.2 °F (36.8 °C) (Oral)   Resp 20   Ht 152.4 cm (60\")   Wt 58.5 kg (129 lb)   SpO2 96%   BMI 25.19 kg/m²   Physical Exam   Constitutional: She appears well-developed and well-nourished.   HENT:   Mouth/Throat: Oropharynx is clear and moist.   Cardiovascular: Normal rate and regular rhythm.   Pulmonary/Chest: Effort normal and breath sounds normal.   Abdominal: Soft. Bowel sounds are normal. She exhibits no distension. There is tenderness (umbilical).   Nursing note and vitals reviewed.      Assessment/Plan   Problem List Items Addressed This Visit        Cardiovascular and Mediastinum    Essential hypertension      Other Visit Diagnoses     Daily nausea    -  Primary              Drink plenty fluids.    Stop Losartan.  Add Indapamide 1.25 mg daily #30+0.    Continue other medications as doing.    Rx for Ondansetron 4 mg every 8 hours as needed #10+1.    Check a UA,CBC,CMP,Amylase, and Lipase. Report results by letter.    Check an Ultra sound of the " abdomen. Report results by letter.    Follow up in 2 weeks.                Scribed for Dr Judd Aguirre by Margret Jimenez CMA.          I, Judd Aguirre MD, personally performed the services described in this documentation, as scribed by Margret Jimenez in my presence, and is both accurate and complete.

## 2018-11-26 ENCOUNTER — TELEPHONE (OUTPATIENT)
Dept: FAMILY MEDICINE CLINIC | Facility: CLINIC | Age: 63
End: 2018-11-26

## 2018-11-26 LAB
ALBUMIN SERPL-MCNC: 4.56 G/DL (ref 3.2–4.8)
ALBUMIN/GLOB SERPL: 2.1 G/DL (ref 1.5–2.5)
ALP SERPL-CCNC: 67 U/L (ref 25–100)
ALT SERPL W P-5'-P-CCNC: 22 U/L (ref 7–40)
ANION GAP SERPL CALCULATED.3IONS-SCNC: 11 MMOL/L (ref 3–11)
AST SERPL-CCNC: 20 U/L (ref 0–33)
BILIRUB SERPL-MCNC: 0.3 MG/DL (ref 0.3–1.2)
BUN BLD-MCNC: 11 MG/DL (ref 9–23)
BUN/CREAT SERPL: 14.7 (ref 7–25)
CALCIUM SPEC-SCNC: 9.7 MG/DL (ref 8.7–10.4)
CHLORIDE SERPL-SCNC: 105 MMOL/L (ref 99–109)
CO2 SERPL-SCNC: 27 MMOL/L (ref 20–31)
CREAT BLD-MCNC: 0.75 MG/DL (ref 0.6–1.3)
GFR SERPL CREATININE-BSD FRML MDRD: 79 ML/MIN/1.73
GLOBULIN UR ELPH-MCNC: 2.1 GM/DL
GLUCOSE BLD-MCNC: 96 MG/DL (ref 70–100)
POTASSIUM BLD-SCNC: 5.1 MMOL/L (ref 3.5–5.5)
PROT SERPL-MCNC: 6.7 G/DL (ref 5.7–8.2)
SODIUM BLD-SCNC: 143 MMOL/L (ref 132–146)

## 2018-11-26 NOTE — TELEPHONE ENCOUNTER
MESSAGE GIVE TO DR REDDY  ----- Message from Tiffanie Lopez sent at 11/26/2018  1:06 PM EST -----  Regarding: PLEASE CALL   Contact: [phone]  PLEASE CALL WITH LAB RESULTS

## 2018-11-26 NOTE — TELEPHONE ENCOUNTER
I spoke to the patient on the telephone.  We discussed the results of her laboratory studies, including the mild elevation of the lipase level.  She is still having upper abdominal discomfort and nausea.  I recommended that she stay on a low-fat diet and that she obtain plenty of fluids.  We will have her use ranitidine 300 mg each evening.  She is to have an ultrasound of the abdomen done on this coming Friday morning.  I will have her return to see me on this coming Friday afternoon.  He also reports that trazodone is keeping her awake and that Xanax makes her feel numb.  I recommended that she discontinue use of both of those medications.

## 2018-11-27 ENCOUNTER — HOSPITAL ENCOUNTER (OUTPATIENT)
Dept: MAMMOGRAPHY | Facility: HOSPITAL | Age: 63
Discharge: HOME | End: 2018-11-27
Attending: SURGERY | Admitting: SURGERY

## 2018-11-27 DIAGNOSIS — Z12.31 VISIT FOR SCREENING MAMMOGRAM: ICD-10-CM

## 2018-11-27 PROCEDURE — 77067 SCR MAMMO BI INCL CAD: CPT | Performed by: RADIOLOGY

## 2018-11-27 PROCEDURE — 77063 BREAST TOMOSYNTHESIS BI: CPT

## 2018-11-27 PROCEDURE — 77067 SCR MAMMO BI INCL CAD: CPT

## 2018-11-27 PROCEDURE — 77063 BREAST TOMOSYNTHESIS BI: CPT | Performed by: RADIOLOGY

## 2018-11-30 ENCOUNTER — OFFICE VISIT (OUTPATIENT)
Dept: FAMILY MEDICINE CLINIC | Facility: CLINIC | Age: 63
End: 2018-11-30

## 2018-11-30 ENCOUNTER — HOSPITAL ENCOUNTER (OUTPATIENT)
Dept: ULTRASOUND IMAGING | Facility: HOSPITAL | Age: 63
Discharge: HOME | End: 2018-11-30
Attending: FAMILY MEDICINE | Admitting: FAMILY MEDICINE

## 2018-11-30 VITALS
BODY MASS INDEX: 24.77 KG/M2 | OXYGEN SATURATION: 95 % | WEIGHT: 126.2 LBS | HEIGHT: 60 IN | RESPIRATION RATE: 20 BRPM | TEMPERATURE: 97.8 F | SYSTOLIC BLOOD PRESSURE: 118 MMHG | DIASTOLIC BLOOD PRESSURE: 80 MMHG | HEART RATE: 83 BPM

## 2018-11-30 DIAGNOSIS — R11.0 DAILY NAUSEA: ICD-10-CM

## 2018-11-30 DIAGNOSIS — K85.00 IDIOPATHIC ACUTE PANCREATITIS WITHOUT INFECTION OR NECROSIS: Primary | ICD-10-CM

## 2018-11-30 DIAGNOSIS — G47.00 INSOMNIA, UNSPECIFIED TYPE: ICD-10-CM

## 2018-11-30 DIAGNOSIS — I10 ESSENTIAL HYPERTENSION: ICD-10-CM

## 2018-11-30 PROCEDURE — 76700 US EXAM ABDOM COMPLETE: CPT

## 2018-11-30 PROCEDURE — 99213 OFFICE O/P EST LOW 20 MIN: CPT | Performed by: FAMILY MEDICINE

## 2018-12-03 RX ORDER — VENLAFAXINE HYDROCHLORIDE 37.5 MG/1
37.5 CAPSULE, EXTENDED RELEASE ORAL DAILY
Qty: 15 CAPSULE | Refills: 1 | Status: SHIPPED | OUTPATIENT
Start: 2018-12-03 | End: 2018-12-07 | Stop reason: SINTOL

## 2018-12-06 ENCOUNTER — TELEPHONE (OUTPATIENT)
Dept: FAMILY MEDICINE CLINIC | Facility: CLINIC | Age: 63
End: 2018-12-06

## 2018-12-06 NOTE — TELEPHONE ENCOUNTER
The patient called on the telephone saying that she is feeling very weak and anxious this morning.  She believes that it is related to her recent taking of venlafaxine.  We will have her discontinue use of that medication and have her return to see me tomorrow.

## 2018-12-07 ENCOUNTER — OFFICE VISIT (OUTPATIENT)
Dept: FAMILY MEDICINE CLINIC | Facility: CLINIC | Age: 63
End: 2018-12-07

## 2018-12-07 VITALS
DIASTOLIC BLOOD PRESSURE: 68 MMHG | SYSTOLIC BLOOD PRESSURE: 118 MMHG | WEIGHT: 125.6 LBS | RESPIRATION RATE: 20 BRPM | BODY MASS INDEX: 24.66 KG/M2 | OXYGEN SATURATION: 98 % | HEIGHT: 60 IN | HEART RATE: 98 BPM | TEMPERATURE: 97.9 F

## 2018-12-07 DIAGNOSIS — T88.7XXA MEDICATION SIDE EFFECT: ICD-10-CM

## 2018-12-07 DIAGNOSIS — F41.9 ANXIETY: Primary | ICD-10-CM

## 2018-12-07 DIAGNOSIS — R10.13 EPIGASTRIC PAIN: ICD-10-CM

## 2018-12-07 PROCEDURE — 99213 OFFICE O/P EST LOW 20 MIN: CPT | Performed by: FAMILY MEDICINE

## 2018-12-07 RX ORDER — BUSPIRONE HYDROCHLORIDE 10 MG/1
10 TABLET ORAL 2 TIMES DAILY
Qty: 60 TABLET | Refills: 2 | Status: SHIPPED | OUTPATIENT
Start: 2018-12-07 | End: 2019-04-22 | Stop reason: SDUPTHER

## 2018-12-07 NOTE — PROGRESS NOTES
"Subjective   Ester Hernandez is a 63 y.o. female seen today for side effects to medication.     History of Present Illness   The patient is here today with c/o increased anxiety and weakness with the Venlafaxine.    States she has stopped the medication and has been feeling better.  Denies any chest pain or shortness of breath.  Still some mild abdominal discomfort and belching in the mornings.    The following portions of the patient's history were reviewed and updated as appropriate: allergies, current medications, past social history and problem list.    Review of Systems   Constitutional: Positive for appetite change (decreased).   Respiratory: Negative for shortness of breath.    Cardiovascular: Negative for chest pain.   Gastrointestinal: Positive for abdominal pain (discomfort in the mornings.) and nausea. Negative for constipation and diarrhea.   Psychiatric/Behavioral: Positive for sleep disturbance (Insomnia). The patient is nervous/anxious.        Objective   /68   Pulse 98   Temp 97.9 °F (36.6 °C) (Temporal)   Resp 20   Ht 152.4 cm (60\")   Wt 57 kg (125 lb 9.6 oz)   SpO2 98%   BMI 24.53 kg/m²   Physical Exam   Constitutional: She appears well-developed and well-nourished.   Nursing note and vitals reviewed.      Assessment/Plan   Problem List Items Addressed This Visit        Nervous and Auditory    RESOLVED: Abdominal pain      Other Visit Diagnoses     Anxiety    -  Primary    Relevant Medications    busPIRone (BUSPAR) 10 MG tablet    Medication side effect                  Drink plenty fluids.    Stay off the Venlafaxine.    Continue medications as doing.    Add Buspar 10 mg twice a day #60+2.    Follow up in 7 days.              Scribed for Dr Judd Aguirre by Margret Jimenez CMA.          I, Judd Aguirre MD, personally performed the services described in this documentation, as scribed by Margret Jimenez in my presence, and is both accurate and complete.        (Please note that portions of " this note were completed with a voice recognition program. Efforts were made to edit the dictations,but occasionally words are mis transcribed.)

## 2018-12-14 ENCOUNTER — OFFICE VISIT (OUTPATIENT)
Dept: FAMILY MEDICINE CLINIC | Facility: CLINIC | Age: 63
End: 2018-12-14

## 2018-12-14 VITALS
WEIGHT: 128.4 LBS | HEIGHT: 60 IN | DIASTOLIC BLOOD PRESSURE: 76 MMHG | SYSTOLIC BLOOD PRESSURE: 118 MMHG | TEMPERATURE: 97.8 F | RESPIRATION RATE: 20 BRPM | HEART RATE: 87 BPM | BODY MASS INDEX: 25.21 KG/M2 | OXYGEN SATURATION: 96 %

## 2018-12-14 DIAGNOSIS — R74.8 ELEVATED PANCREATIC ENZYME: ICD-10-CM

## 2018-12-14 DIAGNOSIS — K58.9 IRRITABLE BOWEL SYNDROME WITHOUT DIARRHEA: ICD-10-CM

## 2018-12-14 DIAGNOSIS — F41.9 ANXIETY: Primary | ICD-10-CM

## 2018-12-14 LAB — LIPASE SERPL-CCNC: 74 U/L (ref 6–51)

## 2018-12-14 PROCEDURE — 36415 COLL VENOUS BLD VENIPUNCTURE: CPT | Performed by: FAMILY MEDICINE

## 2018-12-14 PROCEDURE — 99214 OFFICE O/P EST MOD 30 MIN: CPT | Performed by: FAMILY MEDICINE

## 2018-12-14 PROCEDURE — 83690 ASSAY OF LIPASE: CPT | Performed by: FAMILY MEDICINE

## 2018-12-14 NOTE — PROGRESS NOTES
"Subjective   Ester Hernandez is a 63 y.o. female seen today for Anxiety.     History of Present Illness   The patient is here today to follow up on Anxiety.    States she is eating better and nausea is better the Ranitidine. Weight is up a couple pounds.  States she has not noticed any improvement with insomnia on the Buspar once a day and is wanting to discuss using CBD oil for sleep and anxiety recommended by [facility].      The following portions of the patient's history were reviewed and updated as appropriate: allergies, current medications, past social history and problem list.    Review of Systems   Respiratory: Negative for shortness of breath.    Cardiovascular: Negative for chest pain.   Gastrointestinal:        IBS     Psychiatric/Behavioral: Positive for sleep disturbance (Insomnia). The patient is nervous/anxious.        Objective   /76   Pulse 87   Temp 97.8 °F (36.6 °C) (Temporal)   Resp 20   Ht 152.4 cm (60\")   Wt 58.2 kg (128 lb 6.4 oz)   SpO2 96%   BMI 25.08 kg/m²   Physical Exam   Constitutional: She appears well-developed and well-nourished.   Nursing note and vitals reviewed.      Assessment/Plan   Problem List Items Addressed This Visit     None      Visit Diagnoses     Anxiety    -  Primary    Elevated pancreatic enzyme        Irritable bowel syndrome without diarrhea            The patient's some anxiety has improved somewhat with use of BuSpar.  She is taking it only once a day.  Her abdominal pain has cleared as well and I suspect that they abdominal pain was due to irritable bowel syndrome.  Her lipase level was mildly elevated but the recheck was normal.  She asks about the use of CBD oil.  I advised her that there is no objective studies do indicate that it has an efficacious use.  Nevertheless should she desire to use it told her to be permissible to do so for a few weeks as a trial.  She is hoping that will improve her anxiety and energy.      Drink " plenty fluids.    OK to use the CBD oil. Advised to start out with with a small amount at first.    Take the Buspar 10 mg twice a day.    Continue medications as doing.    Check a Lipase. Report results by letter.    Follow up in 2 months. Sooner if needed.              Scribed for Dr Judd Aguirre by Margret Jimenez CMA.          I, Judd Aguirre MD, personally performed the services described in this documentation, as scribed by Margret Jimenez in my presence, and is both accurate and complete.        (Please note that portions of this note were completed with a voice recognition program. Efforts were made to edit the dictations,but occasionally words are mis transcribed.)

## 2018-12-21 DIAGNOSIS — R10.84 GENERALIZED ABDOMINAL PAIN: Primary | ICD-10-CM

## 2018-12-21 DIAGNOSIS — R74.8 ELEVATED LIPASE: ICD-10-CM

## 2018-12-21 DIAGNOSIS — I10 ESSENTIAL HYPERTENSION: ICD-10-CM

## 2018-12-21 RX ORDER — INDAPAMIDE 1.25 MG/1
1.25 TABLET, FILM COATED ORAL EVERY MORNING
Qty: 30 TABLET | Refills: 0 | Status: SHIPPED | OUTPATIENT
Start: 2018-12-21 | End: 2018-12-21 | Stop reason: SDUPTHER

## 2018-12-21 RX ORDER — INDAPAMIDE 1.25 MG/1
1.25 TABLET, FILM COATED ORAL EVERY MORNING
Qty: 30 TABLET | Refills: 5 | Status: SHIPPED | OUTPATIENT
Start: 2018-12-21 | End: 2019-05-28

## 2018-12-26 ENCOUNTER — TELEPHONE (OUTPATIENT)
Dept: FAMILY MEDICINE CLINIC | Facility: CLINIC | Age: 63
End: 2018-12-26

## 2018-12-26 ENCOUNTER — HOSPITAL ENCOUNTER (OUTPATIENT)
Dept: CT IMAGING | Facility: HOSPITAL | Age: 63
Discharge: HOME | End: 2018-12-26
Attending: FAMILY MEDICINE | Admitting: FAMILY MEDICINE

## 2018-12-26 DIAGNOSIS — R74.8 ELEVATED LIPASE: ICD-10-CM

## 2018-12-26 DIAGNOSIS — R10.84 GENERALIZED ABDOMINAL PAIN: ICD-10-CM

## 2018-12-26 LAB — CREAT BLDA-MCNC: 0.7 MG/DL (ref 0.6–1.3)

## 2018-12-26 PROCEDURE — 82565 ASSAY OF CREATININE: CPT

## 2018-12-26 PROCEDURE — 74170 CT ABD WO CNTRST FLWD CNTRST: CPT

## 2018-12-26 PROCEDURE — 25010000002 IOPAMIDOL 61 % SOLUTION: Performed by: FAMILY MEDICINE

## 2018-12-26 RX ADMIN — IOPAMIDOL 100 ML: 612 INJECTION, SOLUTION INTRAVENOUS at 11:09

## 2018-12-26 NOTE — TELEPHONE ENCOUNTER
I spoke to the patient on the telephone.  We discussed the results of the CT of her abdomen and pelvis.  I advised her that the results were normal.  It does appear that the patient is experiencing quite a bit of stress and is experiencing insomnia.  I will write a prescription for 10 Almaraz Roseanne 50 mg use 1 at night as needed for sleep #15 with no refill.  Continue on the ranitidine.  We will check a pancreatic function test in the future.  Should her symptoms remain unabated then we'll need to refer to gastroenterology.

## 2019-01-09 ENCOUNTER — OFFICE VISIT (OUTPATIENT)
Dept: FAMILY MEDICINE CLINIC | Facility: CLINIC | Age: 64
End: 2019-01-09

## 2019-01-09 VITALS
SYSTOLIC BLOOD PRESSURE: 120 MMHG | DIASTOLIC BLOOD PRESSURE: 80 MMHG | RESPIRATION RATE: 20 BRPM | OXYGEN SATURATION: 99 % | TEMPERATURE: 98.5 F | BODY MASS INDEX: 25.29 KG/M2 | WEIGHT: 128.8 LBS | HEIGHT: 60 IN | HEART RATE: 92 BPM

## 2019-01-09 DIAGNOSIS — R10.84 GENERALIZED ABDOMINAL PAIN: ICD-10-CM

## 2019-01-09 DIAGNOSIS — F41.9 ANXIETY: Primary | ICD-10-CM

## 2019-01-09 DIAGNOSIS — G47.00 INSOMNIA, UNSPECIFIED TYPE: ICD-10-CM

## 2019-01-09 DIAGNOSIS — Z23 NEED FOR IMMUNIZATION AGAINST INFLUENZA: ICD-10-CM

## 2019-01-09 LAB
AMYLASE SERPL-CCNC: 76 U/L (ref 30–118)
LIPASE SERPL-CCNC: 69 U/L (ref 6–51)

## 2019-01-09 PROCEDURE — 90686 IIV4 VACC NO PRSV 0.5 ML IM: CPT | Performed by: FAMILY MEDICINE

## 2019-01-09 PROCEDURE — 99213 OFFICE O/P EST LOW 20 MIN: CPT | Performed by: FAMILY MEDICINE

## 2019-01-09 PROCEDURE — 90471 IMMUNIZATION ADMIN: CPT | Performed by: FAMILY MEDICINE

## 2019-01-09 PROCEDURE — 36415 COLL VENOUS BLD VENIPUNCTURE: CPT | Performed by: FAMILY MEDICINE

## 2019-01-09 PROCEDURE — 83690 ASSAY OF LIPASE: CPT | Performed by: FAMILY MEDICINE

## 2019-01-09 PROCEDURE — 82150 ASSAY OF AMYLASE: CPT | Performed by: FAMILY MEDICINE

## 2019-01-09 RX ORDER — TEMAZEPAM 15 MG/1
CAPSULE ORAL
Refills: 0 | COMMUNITY
Start: 2018-12-26 | End: 2019-01-14

## 2019-01-09 RX ORDER — TRAZODONE HYDROCHLORIDE 50 MG/1
TABLET ORAL
COMMUNITY
Start: 2018-12-25 | End: 2019-01-09 | Stop reason: SINTOL

## 2019-01-09 NOTE — PROGRESS NOTES
"Subjective   Ester Hernandez is a 63 y.o. female seen today for medication follow up.     History of Present Illness   The patient is here today to follow up on Anxiety, Insomnia, and abdominal pain.     States she is doing better.  Denies any chest pain or shortness of breath.    Still some stomach ache occasionally. Is better with Ranitidine.    States she has been sleeping better with the Temazepam.15 mg nightly.  Has been doing some relaxation techniques and has helped some.        The following portions of the patient's history were reviewed and updated as appropriate: allergies, current medications, past social history and problem list.    Review of Systems   Respiratory: Negative for shortness of breath.    Cardiovascular: Negative for chest pain.   Gastrointestinal: Positive for abdominal pain (mild).   Psychiatric/Behavioral: Positive for sleep disturbance. The patient is nervous/anxious.        Objective   /80   Pulse 92   Temp 98.5 °F (36.9 °C) (Temporal)   Resp 20   Ht 152.4 cm (60\")   Wt 58.4 kg (128 lb 12.8 oz)   SpO2 99%   BMI 25.15 kg/m²   Physical Exam   Constitutional: She appears well-developed and well-nourished.   Nursing note and vitals reviewed.      Assessment/Plan   Problem List Items Addressed This Visit        Other    Insomnia      Other Visit Diagnoses     Anxiety    -  Primary    Generalized abdominal pain        Need for immunization against influenza            I believe her abdominal pain has been due to emotional issues.  Hopefully the medication for anxiety will help.  However, should her pancreatic enzymes remain elevated then I think we will need to refer her to gastroenterology.      Drink plenty fluids.    Leave the Buspar off for a 4-5 days and see if it helps the Insomnia. If not restart as doing.    Leave off the Caltrate.  Take the Vitamin D 3 one 2,000 unit tablet daily.    Continue other medications as doing.    Check an Amylase and Lipase. Call on Friday for " results.    Follow up in 4 weeks.                Scribed for Dr Judd Aguirre by Margret Jimenez CMA.          I, Judd Aguirre MD, personally performed the services described in this documentation, as scribed by Margret Jimenez in my presence, and is both accurate and complete.        (Please note that portions of this note were completed with a voice recognition program. Efforts were made to edit the dictations,but occasionally words are mis transcribed.)

## 2019-01-14 ENCOUNTER — OFFICE VISIT (OUTPATIENT)
Dept: FAMILY MEDICINE CLINIC | Facility: CLINIC | Age: 64
End: 2019-01-14

## 2019-01-14 VITALS
RESPIRATION RATE: 16 BRPM | HEIGHT: 60 IN | BODY MASS INDEX: 24.74 KG/M2 | SYSTOLIC BLOOD PRESSURE: 136 MMHG | OXYGEN SATURATION: 97 % | WEIGHT: 126 LBS | HEART RATE: 86 BPM | DIASTOLIC BLOOD PRESSURE: 92 MMHG | TEMPERATURE: 97.9 F

## 2019-01-14 DIAGNOSIS — G47.00 INSOMNIA, UNSPECIFIED TYPE: ICD-10-CM

## 2019-01-14 DIAGNOSIS — R10.10 UPPER ABDOMINAL PAIN: Primary | ICD-10-CM

## 2019-01-14 DIAGNOSIS — R74.8 ELEVATED PANCREATIC ENZYME: ICD-10-CM

## 2019-01-14 PROCEDURE — 99214 OFFICE O/P EST MOD 30 MIN: CPT | Performed by: FAMILY MEDICINE

## 2019-01-14 RX ORDER — AMITRIPTYLINE HYDROCHLORIDE 10 MG/1
TABLET, FILM COATED ORAL
Qty: 30 TABLET | Refills: 0 | Status: SHIPPED | OUTPATIENT
Start: 2019-01-14 | End: 2019-02-05 | Stop reason: SDUPTHER

## 2019-01-14 RX ORDER — RANITIDINE 300 MG/1
300 TABLET ORAL DAILY
Qty: 30 TABLET | Refills: 1 | Status: SHIPPED | OUTPATIENT
Start: 2019-01-14 | End: 2019-05-28

## 2019-01-14 NOTE — PROGRESS NOTES
"Subjective   Ester Hernandez is a 63 y.o. female seen today for Abdominal Pain (f/u ) and Insomnia.     History of Present Illness   The patient is here today to follow up on Anxiety, Insomnia, and abdominal pain.     States she felt bad all weekend. Today at work got nauseous and flushed. Having continued belching. Worse with eating.  Taking the Ranitidine off and on. Using TUMS.  Denies any chest pain or shortness of breath.    States she left the Buspar off for a couple days and has restarted at twice a day. Not able to sleep through the night.        The following portions of the patient's history were reviewed and updated as appropriate: allergies, current medications, past social history and problem list.    Review of Systems   Respiratory: Negative for shortness of breath.    Cardiovascular: Negative for chest pain.   Gastrointestinal: Positive for abdominal pain (epigastric.) and nausea. Negative for constipation, diarrhea and vomiting.   Psychiatric/Behavioral: Positive for sleep disturbance (Insomnia).       Objective   /92   Pulse 86   Temp 97.9 °F (36.6 °C)   Resp 16   Ht 152.4 cm (60\")   Wt 57.2 kg (126 lb)   SpO2 97%   BMI 24.61 kg/m²   Physical Exam   Constitutional: She appears well-developed and well-nourished. No distress.   Eyes: Conjunctivae are normal. Pupils are equal, round, and reactive to light. No scleral icterus.   Neck: Normal range of motion. No thyromegaly present.   Cardiovascular: Normal rate, regular rhythm and normal heart sounds.   Abdominal: Soft.   There is mild epigastric and mild periumbilical tenderness.  No mass.   Lymphadenopathy:     She has no cervical adenopathy.   Skin: She is not diaphoretic.       Assessment/Plan   Problem List Items Addressed This Visit        Other    Insomnia    Relevant Medications    amitriptyline (ELAVIL) 10 MG tablet      Other Visit Diagnoses     Upper abdominal pain    -  Primary    Relevant Medications    raNITIdine (ZANTAC) 300 " MG tablet    Other Relevant Orders    Ambulatory Referral to Gastroenterology    Elevated pancreatic enzyme        Relevant Orders    Ambulatory Referral to Gastroenterology      Pinnae of the symptoms the patient is having are exacerbated by her level of anxiety.  We will try to help her with her sleep and anxiety.  In addition I ranitidine helped before.  She may have peptic ulcer disease.  We will ask gastroenterology to evaluate her especially in view of the elevation of her pancreatic enzyme.        Drink plenty fluids.    Continue Buspar twice a day.  Rx for  Ranitidine 300 mg once daily #30+1.  Rx for Amitriptyline 10 mg 1 or 2 at bedtime #30+0.  Refer to Gastroenterology.    Follow up as needed.                  Scribed for Dr Judd Aguirre by Margret Jimenez CMA.          I, Judd Aguirre MD, personally performed the services described in this documentation, as scribed by Margret Jimenez in my presence, and is both accurate and complete.        (Please note that portions of this note were completed with a voice recognition program. Efforts were made to edit the dictations,but occasionally words are mis transcribed.)

## 2019-02-04 ENCOUNTER — OFFICE VISIT (OUTPATIENT)
Dept: GASTROENTEROLOGY | Facility: CLINIC | Age: 64
End: 2019-02-04

## 2019-02-04 VITALS
DIASTOLIC BLOOD PRESSURE: 106 MMHG | HEART RATE: 111 BPM | SYSTOLIC BLOOD PRESSURE: 166 MMHG | BODY MASS INDEX: 24.61 KG/M2 | WEIGHT: 126 LBS

## 2019-02-04 DIAGNOSIS — R10.13 EPIGASTRIC PAIN: Primary | ICD-10-CM

## 2019-02-04 PROCEDURE — 99244 OFF/OP CNSLTJ NEW/EST MOD 40: CPT | Performed by: INTERNAL MEDICINE

## 2019-02-04 NOTE — PROGRESS NOTES
PCP:  Judd Aguirre MD     No referring provider defined for this encounter.    Chief Complaint   Patient presents with   • Abdominal Pain     New patient        HPI   The patient is a 63-year-old patient that presently is doing.  She is currently doing much better the last 3 weeks.  She did have some periumbilical pain started before Thanksgiving.  She had nausea.  She had no appetite.  She lost about 10 pounds.  The pain was on off.  The pain was not definitively related to food.  The pain seemed to be worse in times of stress.  The pain did not wake her at night.  She has no blood in the stool or vomiting.  She has no melanotic stool.  She had no significant anemia.  Lipase was slightly elevated above normal.  Amylase was normal.  LFTs were normal.  CT of the abdomen on 12/26/18 showed a cyst in the liver but otherwise was unremarkable.  She had a colonoscopy done on 1/21/19 showed diverticulosis and internal hemorrhoids.  He is referred given her abdominal pain and the fact that her lipase was slightly elevated at 69.    Allergies   Allergen Reactions   • Venlafaxine Anxiety     And weakness.     • Amoxicillin    • Azithromycin    • Cephalexin    • Ciprofloxacin    • Clarithromycin    • Doxycycline    • Doxycycline Monohydrate    • Erythromycin    • Levofloxacin    • Loratadine           Current Outpatient Medications:   •  amitriptyline (ELAVIL) 10 MG tablet, take 1 OR 2 tablets by mouth at bedtime, Disp: 30 tablet, Rfl: 0  •  aspirin 81 MG EC tablet, Take 81 mg by mouth daily., Disp: , Rfl:   •  busPIRone (BUSPAR) 10 MG tablet, Take 1 tablet by mouth 2 (Two) Times a Day., Disp: 60 tablet, Rfl: 2  •  cetirizine (zyrTEC) 10 MG tablet, Take 1 tablet by mouth Daily., Disp: 90 tablet, Rfl: 0  •  cholecalciferol (VITAMIN D3) 1000 units tablet, Take 1 tablet by mouth Daily., Disp: 30 tablet, Rfl: 11  •  coenzyme Q10 100 MG capsule, Take 1 capsule by mouth Daily., Disp: 30 capsule, Rfl: 11  •  indapamide (LOZOL)  1.25 MG tablet, Take 1 tablet by mouth Every Morning., Disp: 30 tablet, Rfl: 5  •  ondansetron ODT (ZOFRAN-ODT) 4 MG disintegrating tablet, Take 1 tablet by mouth Every 8 (Eight) Hours As Needed for Nausea or Vomiting., Disp: 10 tablet, Rfl: 1  •  Probiotic Product (PROBIOTIC DAILY PO), Take  by mouth., Disp: , Rfl:   •  raNITIdine (ZANTAC) 300 MG tablet, Take 1 tablet by mouth Daily., Disp: 30 tablet, Rfl: 1     Past Medical History:   Diagnosis Date   • Abdominal pain    • Acute URI    • Breast cancer (CMS/HCC) 2004    right   • Breast cancer (CMS/HCC)    • Cyst of left upper eyelid    • Hx of radiation therapy    • Immunization deficiency    • Radiation 2004    right   • Seasonal allergies    • Sinusitis        Past Surgical History:   Procedure Laterality Date   • BASAL CELL CARCINOMA EXCISION     • BREAST BIOPSY Right 2004    stereo bx   • BREAST LUMPECTOMY Right 2004   • BREAST SURGERY     • HYSTERECTOMY  2004        Social History     Socioeconomic History   • Marital status:      Spouse name: Not on file   • Number of children: Not on file   • Years of education: Not on file   • Highest education level: Not on file   Social Needs   • Financial resource strain: Not on file   • Food insecurity - worry: Not on file   • Food insecurity - inability: Not on file   • Transportation needs - medical: Not on file   • Transportation needs - non-medical: Not on file   Occupational History   • Not on file   Tobacco Use   • Smoking status: Never Smoker   • Smokeless tobacco: Never Used   Substance and Sexual Activity   • Alcohol use: No   • Drug use: No   • Sexual activity: Yes     Partners: Male     Birth control/protection: None     Comment:    Other Topics Concern   • Not on file   Social History Narrative   • Not on file        Family History   Problem Relation Age of Onset   • Lung cancer Mother    • Hypertension Father    • Diabetes Father    • COPD Brother         Review of Systems   Constitutional:  Negative for unexpected weight loss.   HENT: Negative for trouble swallowing.    Eyes: Negative.    Respiratory: Negative.    Gastrointestinal: Negative for abdominal distention, abdominal pain, anal bleeding, blood in stool, constipation, diarrhea, nausea, rectal pain, vomiting, GERD and indigestion.   Endocrine: Negative.    Genitourinary: Negative.    Musculoskeletal: Negative.    Skin: Negative.    Allergic/Immunologic: Negative.    Neurological: Negative.    Hematological: Negative.    Psychiatric/Behavioral: Negative.         Vitals:    02/04/19 1509   BP: (!) 166/106   Pulse: 111        Physical Exam   General Appearance: Alert, in no acute distress   Head: Normocephalic, without obvious abnormality, atraumatic   Eyes: Lids and lashes normal, conjunctivae and sclerae normal, no icterus, no pallor, corneas clear, PERRLA   Ears: Ears appear intact with no abnormalities noted   Throat: No oral lesions, no thrush, oral mucosa moist   Neck: No adenopathy, supple, trachea midline, no thyromegaly, no JVD   Lungs: Clear to auscultation,respirations regular, even and unlabored Heart: Regular rhythm and normal rate, normal S1 and S2, no murmur, no gallop, no rub, no click   Chest Wall: Symmetrical respiratory expansion   Abdomen: Normal bowel sounds, no masses, no organomegaly, soft non-tender, non-distended, no guarding, no rebound tenderness   Extremities: Moves all extremities well, no edema, no cyanosis, no redness   Skin: No bleeding, bruising or rash   Neurologic: Cranial nerves 2 - 12 grossly intact, no focal deficits       Esetr was seen today for abdominal pain.    Diagnoses and all orders for this visit:    Epigastric pain    Impressions and plan #1 epigastric abdominal pain associated with a slightly elevated lipase: Thankfully, her pain has resolved for the past several weeks.  She will contact us if this recurs.  She had a slightly elevated lipase.  This is very nonspecific.  The lipase was not high enough  that we would worry about a significant pancreatitis.  There was no mention of pancreatitis on her CAT scan.  Her amylase was normal as well.  It's difficult to justify further evaluation at this point given the fact that she is asymptomatic.  He worsens we will certainly begin to evaluate further.  Her ultrasound only showed cysts in the liver and the gallbladder was normal.  Her CAT scan confirmed that these were cysts as well.  These are unlikely given her any symptoms.  She will contact us if any symptoms develop.  She has had a recent colonoscopy which was relatively unremarkable.     Td Jacques MD

## 2019-02-05 DIAGNOSIS — G47.00 INSOMNIA, UNSPECIFIED TYPE: ICD-10-CM

## 2019-02-05 RX ORDER — AMITRIPTYLINE HYDROCHLORIDE 10 MG/1
TABLET, FILM COATED ORAL
Qty: 30 TABLET | Refills: 0 | Status: SHIPPED | OUTPATIENT
Start: 2019-02-05 | End: 2019-03-07 | Stop reason: SDUPTHER

## 2019-02-06 PROBLEM — R10.13 EPIGASTRIC PAIN: Status: ACTIVE | Noted: 2019-02-06

## 2019-03-07 DIAGNOSIS — G47.00 INSOMNIA, UNSPECIFIED TYPE: ICD-10-CM

## 2019-03-07 RX ORDER — AMITRIPTYLINE HYDROCHLORIDE 10 MG/1
TABLET, FILM COATED ORAL
Qty: 30 TABLET | Refills: 0 | Status: SHIPPED | OUTPATIENT
Start: 2019-03-07 | End: 2019-04-07 | Stop reason: SDUPTHER

## 2019-04-07 DIAGNOSIS — G47.00 INSOMNIA, UNSPECIFIED TYPE: ICD-10-CM

## 2019-04-08 RX ORDER — AMITRIPTYLINE HYDROCHLORIDE 10 MG/1
TABLET, FILM COATED ORAL
Qty: 30 TABLET | Refills: 0 | Status: SHIPPED | OUTPATIENT
Start: 2019-04-08 | End: 2019-05-08 | Stop reason: SDUPTHER

## 2019-04-22 DIAGNOSIS — F41.9 ANXIETY: ICD-10-CM

## 2019-04-22 RX ORDER — BUSPIRONE HYDROCHLORIDE 10 MG/1
TABLET ORAL
Qty: 60 TABLET | Refills: 2 | Status: SHIPPED | OUTPATIENT
Start: 2019-04-22 | End: 2019-10-21 | Stop reason: SDUPTHER

## 2019-05-08 DIAGNOSIS — G47.00 INSOMNIA, UNSPECIFIED TYPE: ICD-10-CM

## 2019-05-09 RX ORDER — AMITRIPTYLINE HYDROCHLORIDE 10 MG/1
TABLET, FILM COATED ORAL
Qty: 30 TABLET | Refills: 0 | Status: SHIPPED | OUTPATIENT
Start: 2019-05-09 | End: 2019-06-06 | Stop reason: SDUPTHER

## 2019-05-28 ENCOUNTER — OFFICE VISIT (OUTPATIENT)
Dept: FAMILY MEDICINE CLINIC | Facility: CLINIC | Age: 64
End: 2019-05-28

## 2019-05-28 VITALS
RESPIRATION RATE: 14 BRPM | OXYGEN SATURATION: 98 % | TEMPERATURE: 98.4 F | BODY MASS INDEX: 25.21 KG/M2 | WEIGHT: 128.4 LBS | DIASTOLIC BLOOD PRESSURE: 98 MMHG | HEART RATE: 88 BPM | SYSTOLIC BLOOD PRESSURE: 142 MMHG | HEIGHT: 60 IN

## 2019-05-28 DIAGNOSIS — F51.01 PRIMARY INSOMNIA: ICD-10-CM

## 2019-05-28 DIAGNOSIS — E55.9 VITAMIN D DEFICIENCY: ICD-10-CM

## 2019-05-28 DIAGNOSIS — F41.9 ANXIETY: ICD-10-CM

## 2019-05-28 DIAGNOSIS — I10 ESSENTIAL HYPERTENSION: Primary | ICD-10-CM

## 2019-05-28 PROCEDURE — 99204 OFFICE O/P NEW MOD 45 MIN: CPT | Performed by: INTERNAL MEDICINE

## 2019-05-28 RX ORDER — LISINOPRIL 10 MG/1
10 TABLET ORAL DAILY
Qty: 30 TABLET | Refills: 5 | Status: SHIPPED | OUTPATIENT
Start: 2019-05-28 | End: 2019-06-28

## 2019-05-28 NOTE — PROGRESS NOTES
Chief Complaint:  htn    HPI:  Ester Hernandez is a 63 y.o. female who presents today for establish care and follow-up several chronic medical conditions.  He has no acute complaints or concerns today.  Hypertension- currently taking daily indapamide.  Checks blood pressure at home.  She did not bring in readings today.  Anxiety-currently stable on buspirone 10 mg daily.  Insomnia stable on 10 mg of amitriptyline at night.  Currently sleeping around 6 to 7 hours per night.  History of irritable bowel syndrome- well-controlled with better management of anxiety.  Denies any changes in bowel movements or abdominal pain at this time.    ROS:  Constitutional: no fevers, night sweats or unexplained weight loss  Eyes: no vision changes  ENT: no runny nose, ear pain, sore throat  Cardio: no chest pain, palpitations  Pulm: no shortness of breath, wheezing, or cough  GI: no abdominal pain or changes in bowel movements  : no difficulty urinating  MSK: no difficulty ambulating, no joint pain  Neuro: no weakness, dizziness or headache  Psych: no trouble sleeping  Endo: no change in appetite      Past Medical History:   Diagnosis Date   • Abdominal pain    • Acute URI    • Breast cancer (CMS/HCC) 2004    right   • Breast cancer (CMS/HCC)    • Cyst of left upper eyelid    • Hx of radiation therapy    • Immunization deficiency    • Radiation 2004    right   • Seasonal allergies    • Sinusitis       Family History   Problem Relation Age of Onset   • Lung cancer Mother    • Hypertension Father    • Diabetes Father    • COPD Brother       Social History     Socioeconomic History   • Marital status:      Spouse name: Not on file   • Number of children: Not on file   • Years of education: Not on file   • Highest education level: Not on file   Tobacco Use   • Smoking status: Never Smoker   • Smokeless tobacco: Never Used   Substance and Sexual Activity   • Alcohol use: No   • Drug use: No   • Sexual activity: Yes     Partners:  Male     Birth control/protection: None     Comment:       Allergies   Allergen Reactions   • Venlafaxine Anxiety     And weakness.     • Amoxicillin    • Azithromycin    • Cephalexin    • Ciprofloxacin    • Clarithromycin    • Doxycycline    • Doxycycline Monohydrate    • Erythromycin    • Levofloxacin    • Loratadine       Immunization History   Administered Date(s) Administered   • FLUARIX/FLUZONE/AFLURIA/FLULAVAL QUAD 01/09/2019   • Flu Vaccine Quad PF >18YRS 11/03/2016, 01/15/2018   • Tdap 12/20/2010        PE:  Vitals:    05/28/19 1509   BP: 142/98   Pulse: 88   Resp: 14   Temp: 98.4 °F (36.9 °C)   SpO2: 98%        Gen Appearance: NAD  HEENT: Normocephalic, PERRLA, no thyromegaly, trache midline  Heart: RRR, normal S1 and S2, no murmur  Lungs: CTA b/l, no wheezing, no crackles  Abdomen: Soft, non-tender, non-distended, no guarding and BSx4  MSK: Moves all extremities well, normal gait, no peripheral edema  Pulses: Palpable and equal b/l  Lymph nodes: No palpable lymphadenopathy   Neuro: No focal deficits      Current Outpatient Medications   Medication Sig Dispense Refill   • amitriptyline (ELAVIL) 10 MG tablet take 1 to 2 tablets by mouth at bedtime 30 tablet 0   • busPIRone (BUSPAR) 10 MG tablet take 1 tablet by mouth twice a day 60 tablet 2   • cetirizine (zyrTEC) 10 MG tablet Take 1 tablet by mouth Daily. 90 tablet 0   • cholecalciferol (VITAMIN D3) 1000 units tablet Take 1 tablet by mouth Daily. 30 tablet 11   • coenzyme Q10 100 MG capsule Take 1 capsule by mouth Daily. 30 capsule 11   • Probiotic Product (PROBIOTIC DAILY PO) Take  by mouth.     • lisinopril (PRINIVIL,ZESTRIL) 10 MG tablet Take 1 tablet by mouth Daily. 30 tablet 5     No current facility-administered medications for this visit.         Ester was seen today for establish care.    Diagnoses and all orders for this visit:    Essential hypertension  -     lisinopril (PRINIVIL,ZESTRIL) 10 MG tablet; Take 1 tablet by mouth Daily.  -      CBC & Differential; Future  -     Comprehensive Metabolic Panel; Future  -     Lipid Panel; Future  -     Hemoglobin A1c; Future  -     TSH Rfx On Abnormal To Free T4; Future  Recommend starting lisinopril.  Plan on discontinuing indapamide.  Will likely need to increase lisinopril dosing.  Recommend checking blood pressure at home.  Return to clinic 2 weeks.  Checking blood work today.  Vitamin D deficiency  -     Vitamin D 25 Hydroxy; Future  Checking vitamin D level.  Anxiety  Stable on buspirone.  Primary insomnia  Stable on Elavil.       Return in about 1 month (around 6/28/2019).

## 2019-05-29 ENCOUNTER — LAB (OUTPATIENT)
Dept: LAB | Facility: HOSPITAL | Age: 64
End: 2019-05-29

## 2019-05-29 DIAGNOSIS — I10 ESSENTIAL HYPERTENSION: ICD-10-CM

## 2019-05-29 DIAGNOSIS — E55.9 VITAMIN D DEFICIENCY: ICD-10-CM

## 2019-05-29 LAB
25(OH)D3 SERPL-MCNC: 36.4 NG/ML (ref 30–100)
ALBUMIN SERPL-MCNC: 4.7 G/DL (ref 3.5–5.2)
ALBUMIN/GLOB SERPL: 1.9 G/DL
ALP SERPL-CCNC: 53 U/L (ref 39–117)
ALT SERPL W P-5'-P-CCNC: 18 U/L (ref 1–33)
ANION GAP SERPL CALCULATED.3IONS-SCNC: 11.3 MMOL/L
AST SERPL-CCNC: 20 U/L (ref 1–32)
BASOPHILS # BLD AUTO: 0.02 10*3/MM3 (ref 0–0.2)
BASOPHILS NFR BLD AUTO: 0.4 % (ref 0–1.5)
BILIRUB SERPL-MCNC: 0.6 MG/DL (ref 0.2–1.2)
BUN BLD-MCNC: 10 MG/DL (ref 8–23)
BUN/CREAT SERPL: 13 (ref 7–25)
CALCIUM SPEC-SCNC: 9.2 MG/DL (ref 8.6–10.5)
CHLORIDE SERPL-SCNC: 99 MMOL/L (ref 98–107)
CHOLEST SERPL-MCNC: 259 MG/DL (ref 0–200)
CO2 SERPL-SCNC: 28.7 MMOL/L (ref 22–29)
CREAT BLD-MCNC: 0.77 MG/DL (ref 0.57–1)
DEPRECATED RDW RBC AUTO: 42.9 FL (ref 37–54)
EOSINOPHIL # BLD AUTO: 0.03 10*3/MM3 (ref 0–0.4)
EOSINOPHIL NFR BLD AUTO: 0.5 % (ref 0.3–6.2)
ERYTHROCYTE [DISTWIDTH] IN BLOOD BY AUTOMATED COUNT: 13.1 % (ref 12.3–15.4)
GFR SERPL CREATININE-BSD FRML MDRD: 76 ML/MIN/1.73
GLOBULIN UR ELPH-MCNC: 2.5 GM/DL
GLUCOSE BLD-MCNC: 94 MG/DL (ref 65–99)
HBA1C MFR BLD: 5.2 % (ref 4.8–5.6)
HCT VFR BLD AUTO: 45.7 % (ref 34–46.6)
HDLC SERPL-MCNC: 76 MG/DL (ref 40–60)
HGB BLD-MCNC: 14.6 G/DL (ref 12–15.9)
IMM GRANULOCYTES # BLD AUTO: 0.01 10*3/MM3 (ref 0–0.05)
IMM GRANULOCYTES NFR BLD AUTO: 0.2 % (ref 0–0.5)
LDLC SERPL CALC-MCNC: 167 MG/DL (ref 0–100)
LDLC/HDLC SERPL: 2.2 {RATIO}
LYMPHOCYTES # BLD AUTO: 1.54 10*3/MM3 (ref 0.7–3.1)
LYMPHOCYTES NFR BLD AUTO: 27 % (ref 19.6–45.3)
MCH RBC QN AUTO: 28.6 PG (ref 26.6–33)
MCHC RBC AUTO-ENTMCNC: 31.9 G/DL (ref 31.5–35.7)
MCV RBC AUTO: 89.4 FL (ref 79–97)
MONOCYTES # BLD AUTO: 0.4 10*3/MM3 (ref 0.1–0.9)
MONOCYTES NFR BLD AUTO: 7 % (ref 5–12)
NEUTROPHILS # BLD AUTO: 3.7 10*3/MM3 (ref 1.7–7)
NEUTROPHILS NFR BLD AUTO: 64.9 % (ref 42.7–76)
NRBC BLD AUTO-RTO: 0 /100 WBC (ref 0–0.2)
PLATELET # BLD AUTO: 268 10*3/MM3 (ref 140–450)
PMV BLD AUTO: 11.5 FL (ref 6–12)
POTASSIUM BLD-SCNC: 4.2 MMOL/L (ref 3.5–5.2)
PROT SERPL-MCNC: 7.2 G/DL (ref 6–8.5)
RBC # BLD AUTO: 5.11 10*6/MM3 (ref 3.77–5.28)
SODIUM BLD-SCNC: 139 MMOL/L (ref 136–145)
T4 FREE SERPL-MCNC: 1.19 NG/DL (ref 0.93–1.7)
TRIGL SERPL-MCNC: 78 MG/DL (ref 0–150)
TSH SERPL DL<=0.05 MIU/L-ACNC: 4.33 MIU/ML (ref 0.27–4.2)
VLDLC SERPL-MCNC: 15.6 MG/DL (ref 5–40)
WBC NRBC COR # BLD: 5.7 10*3/MM3 (ref 3.4–10.8)

## 2019-05-29 PROCEDURE — 84443 ASSAY THYROID STIM HORMONE: CPT | Performed by: INTERNAL MEDICINE

## 2019-05-29 PROCEDURE — 83036 HEMOGLOBIN GLYCOSYLATED A1C: CPT | Performed by: INTERNAL MEDICINE

## 2019-05-29 PROCEDURE — 82306 VITAMIN D 25 HYDROXY: CPT | Performed by: INTERNAL MEDICINE

## 2019-05-29 PROCEDURE — 84439 ASSAY OF FREE THYROXINE: CPT

## 2019-05-29 PROCEDURE — 80053 COMPREHEN METABOLIC PANEL: CPT | Performed by: INTERNAL MEDICINE

## 2019-05-29 PROCEDURE — 80061 LIPID PANEL: CPT | Performed by: INTERNAL MEDICINE

## 2019-05-29 PROCEDURE — 85025 COMPLETE CBC W/AUTO DIFF WBC: CPT | Performed by: INTERNAL MEDICINE

## 2019-05-31 ENCOUNTER — TELEPHONE (OUTPATIENT)
Dept: FAMILY MEDICINE CLINIC | Facility: CLINIC | Age: 64
End: 2019-05-31

## 2019-06-05 DIAGNOSIS — G47.00 INSOMNIA, UNSPECIFIED TYPE: ICD-10-CM

## 2019-06-05 RX ORDER — AMITRIPTYLINE HYDROCHLORIDE 10 MG/1
TABLET, FILM COATED ORAL
Qty: 30 TABLET | Refills: 0 | OUTPATIENT
Start: 2019-06-05

## 2019-06-06 DIAGNOSIS — G47.00 INSOMNIA, UNSPECIFIED TYPE: ICD-10-CM

## 2019-06-07 RX ORDER — AMITRIPTYLINE HYDROCHLORIDE 10 MG/1
TABLET, FILM COATED ORAL
Qty: 30 TABLET | Refills: 0 | Status: SHIPPED | OUTPATIENT
Start: 2019-06-07 | End: 2019-07-12 | Stop reason: SDUPTHER

## 2019-06-28 ENCOUNTER — TELEPHONE (OUTPATIENT)
Dept: FAMILY MEDICINE CLINIC | Facility: CLINIC | Age: 64
End: 2019-06-28

## 2019-06-28 ENCOUNTER — OFFICE VISIT (OUTPATIENT)
Dept: FAMILY MEDICINE CLINIC | Facility: CLINIC | Age: 64
End: 2019-06-28

## 2019-06-28 ENCOUNTER — APPOINTMENT (OUTPATIENT)
Dept: LAB | Facility: HOSPITAL | Age: 64
End: 2019-06-28

## 2019-06-28 VITALS
HEART RATE: 99 BPM | OXYGEN SATURATION: 99 % | RESPIRATION RATE: 16 BRPM | WEIGHT: 130.8 LBS | DIASTOLIC BLOOD PRESSURE: 88 MMHG | TEMPERATURE: 98.8 F | HEIGHT: 60 IN | BODY MASS INDEX: 25.68 KG/M2 | SYSTOLIC BLOOD PRESSURE: 132 MMHG

## 2019-06-28 DIAGNOSIS — E03.9 HYPOTHYROIDISM, UNSPECIFIED TYPE: Primary | ICD-10-CM

## 2019-06-28 DIAGNOSIS — F41.9 ANXIETY: ICD-10-CM

## 2019-06-28 DIAGNOSIS — F51.01 PRIMARY INSOMNIA: ICD-10-CM

## 2019-06-28 DIAGNOSIS — R74.8 ELEVATED LIPASE: ICD-10-CM

## 2019-06-28 DIAGNOSIS — I10 ESSENTIAL HYPERTENSION: ICD-10-CM

## 2019-06-28 LAB
ANION GAP SERPL CALCULATED.3IONS-SCNC: 11.5 MMOL/L (ref 5–15)
BUN BLD-MCNC: 13 MG/DL (ref 8–23)
BUN/CREAT SERPL: 17.6 (ref 7–25)
CALCIUM SPEC-SCNC: 9.5 MG/DL (ref 8.6–10.5)
CHLORIDE SERPL-SCNC: 104 MMOL/L (ref 98–107)
CO2 SERPL-SCNC: 28.5 MMOL/L (ref 22–29)
CREAT BLD-MCNC: 0.74 MG/DL (ref 0.57–1)
GFR SERPL CREATININE-BSD FRML MDRD: 80 ML/MIN/1.73
GLUCOSE BLD-MCNC: 47 MG/DL (ref 65–99)
LIPASE SERPL-CCNC: 47 U/L (ref 13–60)
POTASSIUM BLD-SCNC: 4.4 MMOL/L (ref 3.5–5.2)
SODIUM BLD-SCNC: 144 MMOL/L (ref 136–145)

## 2019-06-28 PROCEDURE — 36415 COLL VENOUS BLD VENIPUNCTURE: CPT | Performed by: INTERNAL MEDICINE

## 2019-06-28 PROCEDURE — 83690 ASSAY OF LIPASE: CPT | Performed by: INTERNAL MEDICINE

## 2019-06-28 PROCEDURE — 99214 OFFICE O/P EST MOD 30 MIN: CPT | Performed by: INTERNAL MEDICINE

## 2019-06-28 PROCEDURE — 80048 BASIC METABOLIC PNL TOTAL CA: CPT | Performed by: INTERNAL MEDICINE

## 2019-06-28 RX ORDER — LEVOTHYROXINE SODIUM 0.05 MG/1
50 TABLET ORAL DAILY
Qty: 30 TABLET | Refills: 2 | Status: SHIPPED | OUTPATIENT
Start: 2019-06-28 | End: 2019-10-02 | Stop reason: SDUPTHER

## 2019-06-28 RX ORDER — LISINOPRIL 20 MG/1
20 TABLET ORAL DAILY
Qty: 90 TABLET | Refills: 1 | Status: SHIPPED | OUTPATIENT
Start: 2019-06-28 | End: 2020-02-29

## 2019-06-28 NOTE — PROGRESS NOTES
Chief Complaint:  htn    HPI:  Ester Hernandez is a 63 y.o. female who presents today for follow-up several chronic medical conditions.  Hypertension-patient has been taking lisinopril 10 mg daily.  She denies any side effects.  Blood pressure checked usually 130/80 when checked at home.  She reports a history of whitecoat hypertension.  Anxiety-well-controlled on BuSpar.  Insomnia-likely due to anxiety, well-controlled with amitriptyline 10 mg at night.    ROS:  Constitutional: no fevers, night sweats or unexplained weight loss  Eyes: no vision changes  ENT: no runny nose, ear pain, sore throat  Cardio: no chest pain, palpitations  Pulm: no shortness of breath, wheezing, or cough  GI: no abdominal pain or changes in bowel movements  : no difficulty urinating  MSK: no difficulty ambulating, no joint pain  Neuro: no weakness, dizziness or headache  Psych: no trouble sleeping  Endo: no change in appetite      Past Medical History:   Diagnosis Date   • Abdominal pain    • Acute URI    • Breast cancer (CMS/HCC) 2004    right   • Breast cancer (CMS/HCC)    • Cyst of left upper eyelid    • Hx of radiation therapy    • Immunization deficiency    • Radiation 2004    right   • Seasonal allergies    • Sinusitis       Family History   Problem Relation Age of Onset   • Lung cancer Mother    • Hypertension Father    • Diabetes Father    • COPD Brother       Social History     Socioeconomic History   • Marital status:      Spouse name: Not on file   • Number of children: Not on file   • Years of education: Not on file   • Highest education level: Not on file   Tobacco Use   • Smoking status: Never Smoker   • Smokeless tobacco: Never Used   Substance and Sexual Activity   • Alcohol use: No   • Drug use: No   • Sexual activity: Yes     Partners: Male     Birth control/protection: None     Comment:       Allergies   Allergen Reactions   • Venlafaxine Anxiety     And weakness.     • Amoxicillin    • Azithromycin    •  Cephalexin    • Ciprofloxacin    • Clarithromycin    • Doxycycline    • Doxycycline Monohydrate    • Erythromycin    • Levofloxacin    • Loratadine       Immunization History   Administered Date(s) Administered   • FLUARIX/FLUZONE/AFLURIA/FLULAVAL QUAD 01/09/2019   • Flu Vaccine Quad PF >18YRS 11/03/2016, 01/15/2018   • Tdap 12/20/2010        PE:  Vitals:    06/28/19 0847   BP: 132/88   Pulse: 99   Resp: 16   Temp: 98.8 °F (37.1 °C)   SpO2: 99%        Gen Appearance: NAD  HEENT: Normocephalic, PERRLA, no thyromegaly, trache midline  Heart: RRR, normal S1 and S2, no murmur  Lungs: CTA b/l, no wheezing, no crackles  Abdomen: Soft, non-tender, non-distended, no guarding and BSx4  MSK: Moves all extremities well, normal gait, no peripheral edema  Pulses: Palpable and equal b/l  Lymph nodes: No palpable lymphadenopathy   Neuro: No focal deficits      Current Outpatient Medications   Medication Sig Dispense Refill   • amitriptyline (ELAVIL) 10 MG tablet take 1 to 2 tablets by mouth at bedtime 30 tablet 0   • busPIRone (BUSPAR) 10 MG tablet take 1 tablet by mouth twice a day 60 tablet 2   • cetirizine (zyrTEC) 10 MG tablet Take 1 tablet by mouth Daily. 90 tablet 0   • cholecalciferol (VITAMIN D3) 1000 units tablet Take 1 tablet by mouth Daily. 30 tablet 11   • coenzyme Q10 100 MG capsule Take 1 capsule by mouth Daily. 30 capsule 11   • Probiotic Product (PROBIOTIC DAILY PO) Take  by mouth.     • levothyroxine (SYNTHROID) 50 MCG tablet Take 1 tablet by mouth Daily. 30 tablet 2   • lisinopril (PRINIVIL,ZESTRIL) 20 MG tablet Take 1 tablet by mouth Daily. 90 tablet 1     No current facility-administered medications for this visit.         Ester was seen today for hypertension.    Diagnoses and all orders for this visit:    Hypothyroidism, unspecified type  -     lisinopril (PRINIVIL,ZESTRIL) 20 MG tablet; Take 1 tablet by mouth Daily.  -     levothyroxine (SYNTHROID) 50 MCG tablet; Take 1 tablet by mouth Daily.  Elevated TSH  although low end of normal free thyroid levels.  Will trial low-dose Synthroid for the next 6 weeks.  Plan on rechecking TSH at that time.   Essential hypertension  -     Basic metabolic panel; Future  -     Basic metabolic panel  Increase lisinopril to 20 mg daily.  Rechecking blood work today.  Primary insomnia  Stable on amitriptyline.  Continue.  Anxiety  Stable on BuSpar.  Continue.  Elevated lipase  -     Lipase; Future  -     Lipase         Return in about 6 months (around 12/28/2019).

## 2019-06-28 NOTE — TELEPHONE ENCOUNTER
lab called with a critical result. Glucose level 47   Informed . He stated that he would contact the patient and advise.

## 2019-07-12 DIAGNOSIS — G47.00 INSOMNIA, UNSPECIFIED TYPE: ICD-10-CM

## 2019-07-12 RX ORDER — AMITRIPTYLINE HYDROCHLORIDE 10 MG/1
10-20 TABLET, FILM COATED ORAL
Qty: 60 TABLET | Refills: 0 | Status: SHIPPED | OUTPATIENT
Start: 2019-07-12 | End: 2019-09-03 | Stop reason: SDUPTHER

## 2019-09-03 DIAGNOSIS — G47.00 INSOMNIA, UNSPECIFIED TYPE: ICD-10-CM

## 2019-09-04 RX ORDER — AMITRIPTYLINE HYDROCHLORIDE 10 MG/1
TABLET, FILM COATED ORAL
Qty: 60 TABLET | Refills: 2 | Status: SHIPPED | OUTPATIENT
Start: 2019-09-04 | End: 2020-02-29

## 2019-10-02 DIAGNOSIS — E03.9 HYPOTHYROIDISM, UNSPECIFIED TYPE: ICD-10-CM

## 2019-10-03 RX ORDER — LEVOTHYROXINE SODIUM 0.05 MG/1
50 TABLET ORAL DAILY
Qty: 90 TABLET | Refills: 0 | Status: SHIPPED | OUTPATIENT
Start: 2019-10-03 | End: 2020-01-06

## 2019-10-21 DIAGNOSIS — F41.9 ANXIETY: ICD-10-CM

## 2019-10-22 RX ORDER — BUSPIRONE HYDROCHLORIDE 10 MG/1
TABLET ORAL
Qty: 60 TABLET | Refills: 2 | Status: SHIPPED | OUTPATIENT
Start: 2019-10-22 | End: 2020-02-29 | Stop reason: SDUPTHER

## 2019-11-11 ENCOUNTER — TRANSCRIBE ORDERS (OUTPATIENT)
Dept: ADMINISTRATIVE | Facility: HOSPITAL | Age: 64
End: 2019-11-11

## 2019-11-11 DIAGNOSIS — Z12.31 VISIT FOR SCREENING MAMMOGRAM: Primary | ICD-10-CM

## 2019-11-19 ENCOUNTER — TELEPHONE (OUTPATIENT)
Dept: FAMILY MEDICINE CLINIC | Facility: CLINIC | Age: 64
End: 2019-11-19

## 2019-11-19 RX ORDER — SACCHAROMYCES BOULARDII 250 MG
250 CAPSULE ORAL DAILY
Qty: 30 CAPSULE | Refills: 5 | Status: SHIPPED | OUTPATIENT
Start: 2019-11-19 | End: 2019-12-10 | Stop reason: SDUPTHER

## 2019-12-10 ENCOUNTER — OFFICE VISIT (OUTPATIENT)
Dept: FAMILY MEDICINE CLINIC | Facility: CLINIC | Age: 64
End: 2019-12-10

## 2019-12-10 VITALS
DIASTOLIC BLOOD PRESSURE: 64 MMHG | HEART RATE: 106 BPM | BODY MASS INDEX: 25.13 KG/M2 | OXYGEN SATURATION: 98 % | WEIGHT: 128 LBS | HEIGHT: 60 IN | SYSTOLIC BLOOD PRESSURE: 126 MMHG | TEMPERATURE: 98.1 F

## 2019-12-10 DIAGNOSIS — J06.9 VIRAL UPPER RESPIRATORY TRACT INFECTION: Primary | ICD-10-CM

## 2019-12-10 DIAGNOSIS — K52.9 GASTROENTERITIS: ICD-10-CM

## 2019-12-10 DIAGNOSIS — E03.8 HYPOTHYROIDISM DUE TO HASHIMOTO'S THYROIDITIS: ICD-10-CM

## 2019-12-10 DIAGNOSIS — F51.01 PRIMARY INSOMNIA: ICD-10-CM

## 2019-12-10 DIAGNOSIS — R11.0 NAUSEA: ICD-10-CM

## 2019-12-10 DIAGNOSIS — E06.3 HYPOTHYROIDISM DUE TO HASHIMOTO'S THYROIDITIS: ICD-10-CM

## 2019-12-10 PROCEDURE — 99214 OFFICE O/P EST MOD 30 MIN: CPT | Performed by: INTERNAL MEDICINE

## 2019-12-10 RX ORDER — MELATONIN
1000 DAILY
Qty: 90 TABLET | Refills: 3 | Status: SHIPPED | OUTPATIENT
Start: 2019-12-10 | End: 2020-03-27 | Stop reason: SDUPTHER

## 2019-12-10 RX ORDER — SACCHAROMYCES BOULARDII 250 MG
250 CAPSULE ORAL DAILY
Qty: 90 CAPSULE | Refills: 3 | Status: SHIPPED | OUTPATIENT
Start: 2019-12-10 | End: 2020-08-25

## 2019-12-10 RX ORDER — UBIDECARENONE 100 MG
100 CAPSULE ORAL DAILY
Qty: 90 CAPSULE | Refills: 3 | Status: SHIPPED | OUTPATIENT
Start: 2019-12-10 | End: 2020-03-11 | Stop reason: SDUPTHER

## 2019-12-10 RX ORDER — ONDANSETRON 4 MG/1
4 TABLET, FILM COATED ORAL EVERY 8 HOURS PRN
Qty: 12 TABLET | Refills: 0 | Status: SHIPPED | OUTPATIENT
Start: 2019-12-10 | End: 2022-10-14

## 2019-12-10 NOTE — PROGRESS NOTES
Chief Complaint:  URI    HPI:  Ester Hernandez is a 64 y.o. female who presents today for cold symptoms and nausea/diarrhea for the last 7 days.  Patient works at [employer] and is currently exposed to many people throughout the day. She reports fatigue, runny nose, congestion, mild sore throat for the last 7 days. She woke up Sunday morning with diarrhea and nausea, although diarrhea has resolved. She still feels nauseous and does not have much of an appetite. She has been trying to keep up with water intake throughout the day.  No recent travel.  No blood in stool.  No abdominal pain.  No fevers or chills.  Insomnia-taking amitriptyline 10 mg at night, still having some difficulty sleeping the full night.  She typically gets around 5-6 hours of sleep.  Amitriptyline does help her fall asleep at night.  She would like a refill on her supplements including probiotics today.  Hypothyroidism- taking levothyroxine daily, denies any symptoms of hypo-or hyperthyroidism.    ROS:  Constitutional: no fevers, night sweats or unexplained weight loss  Eyes: no vision changes  ENT: + runny nose, +ear pain, +sore throat  Cardio: no chest pain, palpitations  Pulm: no shortness of breath, wheezing, or cough  GI: no abdominal pain + changes in bowel movements  : no difficulty urinating  MSK: no difficulty ambulating, no joint pain  Neuro: no weakness, dizziness or headache  Psych: no trouble sleeping  Endo: no change in appetite      Past Medical History:   Diagnosis Date   • Abdominal pain    • Acute URI    • Breast cancer (CMS/HCC) 2004    right   • Breast cancer (CMS/HCC)    • Cyst of left upper eyelid    • Hx of radiation therapy    • Immunization deficiency    • Radiation 2004    right   • Seasonal allergies    • Sinusitis       Family History   Problem Relation Age of Onset   • Lung cancer Mother    • Hypertension Father    • Diabetes Father    • COPD Brother       Social History     Socioeconomic History   • Marital status:       Spouse name: Not on file   • Number of children: Not on file   • Years of education: Not on file   • Highest education level: Not on file   Tobacco Use   • Smoking status: Never Smoker   • Smokeless tobacco: Never Used   Substance and Sexual Activity   • Alcohol use: No   • Drug use: No   • Sexual activity: Yes     Partners: Male     Birth control/protection: None     Comment:       Allergies   Allergen Reactions   • Venlafaxine Anxiety     And weakness.     • Amoxicillin    • Azithromycin    • Cephalexin    • Ciprofloxacin    • Clarithromycin    • Doxycycline    • Doxycycline Monohydrate    • Erythromycin    • Levofloxacin    • Loratadine       Immunization History   Administered Date(s) Administered   • FLUARIX/FLUZONE/AFLURIA/FLULAVAL QUAD 01/09/2019   • Flu Vaccine Quad PF >18YRS 11/03/2016, 01/15/2018   • Tdap 12/20/2010        PE:  Vitals:    12/10/19 0754   BP: 126/64   Pulse: 106   Temp: 98.1 °F (36.7 °C)   SpO2: 98%      Body mass index is 25 kg/m².    Gen Appearance: NAD  HEENT: Normocephalic, PERRLA, no thyromegaly, trache midline, nonerythematous pharynx, clear TM bilaterally, no maxillary sinus tenderness  Heart: RRR, normal S1 and S2, no murmur  Lungs: CTA b/l, no wheezing, no crackles  Abdomen: Soft, non-tender, non-distended, no guarding and BSx4  MSK: Moves all extremities well, normal gait, no peripheral edema  Pulses: Palpable and equal b/l  Lymph nodes: No palpable lymphadenopathy   Neuro: No focal deficits      Current Outpatient Medications   Medication Sig Dispense Refill   • amitriptyline (ELAVIL) 10 MG tablet take 1-2 tablets by mouth at bedtime 60 tablet 2   • busPIRone (BUSPAR) 10 MG tablet take 1 tablet by mouth twice a day 60 tablet 2   • cetirizine (zyrTEC) 10 MG tablet Take 1 tablet by mouth Daily. 90 tablet 0   • cholecalciferol (VITAMIN D3) 25 MCG (1000 UT) tablet Take 1 tablet by mouth Daily. 90 tablet 3   • coenzyme Q10 100 MG capsule Take 1 capsule by mouth  Daily. 90 capsule 3   • levothyroxine (SYNTHROID, LEVOTHROID) 50 MCG tablet take 1 tablet by mouth daily 90 tablet 0   • lisinopril (PRINIVIL,ZESTRIL) 20 MG tablet Take 1 tablet by mouth Daily. 90 tablet 1   • Probiotic Product (PROBIOTIC DAILY PO) Take  by mouth.     • saccharomyces boulardii (FLORASTOR) 250 MG capsule Take 1 capsule by mouth Daily. 90 capsule 3   • ondansetron (ZOFRAN) 4 MG tablet Take 1 tablet by mouth Every 8 (Eight) Hours As Needed for Nausea or Vomiting. 12 tablet 0     No current facility-administered medications for this visit.         Ester was seen today for gi problem and uri.    Diagnoses and all orders for this visit:    Viral upper respiratory tract infection  Likely viral upper respiratory infection, this may have caused upset stomach and nausea as well.  Recommend plenty of rest and hydration.  Discussed brat diet.  Zofran for nausea.  Patient would like to avoid antibiotics at this time.  Call or return to clinic if no better or any worsening symptoms in the next 1 to 2 weeks.  Gastroenteritis  See above.  Primary insomnia  Recommend adding on melatonin at night with amitriptyline.  May take 2 amitriptyline at night if needed.  Nausea  Zofran as needed.  Hypothyroidism due to Hashimoto's thyroiditis  -     TSH+Free T4; Future  Plan on rechecking TSH in 1 to 2 months.  Ordered today.  Other orders  -     ondansetron (ZOFRAN) 4 MG tablet; Take 1 tablet by mouth Every 8 (Eight) Hours As Needed for Nausea or Vomiting.  -     cholecalciferol (VITAMIN D3) 25 MCG (1000 UT) tablet; Take 1 tablet by mouth Daily.  -     coenzyme Q10 100 MG capsule; Take 1 capsule by mouth Daily.  -     saccharomyces boulardii (FLORASTOR) 250 MG capsule; Take 1 capsule by mouth Daily.         Return in about 3 months (around 3/10/2020).     Please note that portions of this document were completed with a voice recognition program. Efforts were made to edit the dictations, but occasionally words are  mis-transcribed.

## 2020-01-04 DIAGNOSIS — E03.9 HYPOTHYROIDISM, UNSPECIFIED TYPE: ICD-10-CM

## 2020-01-06 RX ORDER — LEVOTHYROXINE SODIUM 0.05 MG/1
TABLET ORAL
Qty: 90 TABLET | Refills: 0 | Status: SHIPPED | OUTPATIENT
Start: 2020-01-06 | End: 2020-04-10 | Stop reason: SDUPTHER

## 2020-02-24 ENCOUNTER — HOSPITAL ENCOUNTER (OUTPATIENT)
Dept: MAMMOGRAPHY | Facility: HOSPITAL | Age: 65
Discharge: HOME | End: 2020-02-24
Admitting: SURGERY

## 2020-02-24 DIAGNOSIS — Z12.31 VISIT FOR SCREENING MAMMOGRAM: ICD-10-CM

## 2020-02-24 PROCEDURE — 77067 SCR MAMMO BI INCL CAD: CPT | Performed by: RADIOLOGY

## 2020-02-24 PROCEDURE — 77063 BREAST TOMOSYNTHESIS BI: CPT

## 2020-02-24 PROCEDURE — 77063 BREAST TOMOSYNTHESIS BI: CPT | Performed by: RADIOLOGY

## 2020-02-24 PROCEDURE — 77067 SCR MAMMO BI INCL CAD: CPT

## 2020-02-29 DIAGNOSIS — G47.00 INSOMNIA, UNSPECIFIED TYPE: ICD-10-CM

## 2020-02-29 DIAGNOSIS — E03.9 HYPOTHYROIDISM, UNSPECIFIED TYPE: ICD-10-CM

## 2020-02-29 DIAGNOSIS — F41.9 ANXIETY: ICD-10-CM

## 2020-02-29 RX ORDER — LISINOPRIL 20 MG/1
20 TABLET ORAL DAILY
Qty: 90 TABLET | Refills: 1 | Status: SHIPPED | OUTPATIENT
Start: 2020-02-29 | End: 2020-04-14 | Stop reason: SDUPTHER

## 2020-02-29 RX ORDER — AMITRIPTYLINE HYDROCHLORIDE 10 MG/1
TABLET, FILM COATED ORAL
Qty: 180 TABLET | Refills: 0 | Status: SHIPPED | OUTPATIENT
Start: 2020-02-29 | End: 2020-05-27

## 2020-02-29 RX ORDER — BUSPIRONE HYDROCHLORIDE 10 MG/1
10 TABLET ORAL 2 TIMES DAILY
Qty: 60 TABLET | Refills: 2 | Status: SHIPPED | OUTPATIENT
Start: 2020-02-29 | End: 2020-04-14 | Stop reason: SDUPTHER

## 2020-02-29 NOTE — TELEPHONE ENCOUNTER
Scripts sent. Do not see anything in the chart from [facility]. Called patient, no answer. Lvm([facility]) with office number to call back if needed

## 2020-02-29 NOTE — TELEPHONE ENCOUNTER
Pt called regarding her medication refills. She need refills on LISINOPRIL,AMITRIPTYLINE, and BUSPIRONE.    She stated a refill request was [facility]ed in on the [facility]th [facility], but she hasn't heard anything about it so she wanted to call.

## 2020-03-11 ENCOUNTER — APPOINTMENT (OUTPATIENT)
Dept: LAB | Facility: HOSPITAL | Age: 65
End: 2020-03-11

## 2020-03-11 ENCOUNTER — OFFICE VISIT (OUTPATIENT)
Dept: FAMILY MEDICINE CLINIC | Facility: CLINIC | Age: 65
End: 2020-03-11

## 2020-03-11 VITALS
SYSTOLIC BLOOD PRESSURE: 118 MMHG | OXYGEN SATURATION: 98 % | DIASTOLIC BLOOD PRESSURE: 84 MMHG | BODY MASS INDEX: 26.31 KG/M2 | WEIGHT: 134 LBS | HEART RATE: 91 BPM | HEIGHT: 60 IN

## 2020-03-11 DIAGNOSIS — E03.8 HYPOTHYROIDISM DUE TO HASHIMOTO'S THYROIDITIS: ICD-10-CM

## 2020-03-11 DIAGNOSIS — E55.9 VITAMIN D DEFICIENCY: ICD-10-CM

## 2020-03-11 DIAGNOSIS — I10 ESSENTIAL HYPERTENSION: ICD-10-CM

## 2020-03-11 DIAGNOSIS — I10 ESSENTIAL HYPERTENSION: Primary | ICD-10-CM

## 2020-03-11 DIAGNOSIS — Z00.00 PREVENTATIVE HEALTH CARE: Primary | ICD-10-CM

## 2020-03-11 DIAGNOSIS — E06.3 HYPOTHYROIDISM DUE TO HASHIMOTO'S THYROIDITIS: ICD-10-CM

## 2020-03-11 LAB
25(OH)D3 SERPL-MCNC: 29.9 NG/ML (ref 30–100)
ALBUMIN SERPL-MCNC: 4.2 G/DL (ref 3.5–5.2)
ALBUMIN/GLOB SERPL: 1.8 G/DL
ALP SERPL-CCNC: 64 U/L (ref 39–117)
ALT SERPL W P-5'-P-CCNC: 31 U/L (ref 1–33)
ANION GAP SERPL CALCULATED.3IONS-SCNC: 11.1 MMOL/L (ref 5–15)
AST SERPL-CCNC: 29 U/L (ref 1–32)
BASOPHILS # BLD AUTO: 0.03 10*3/MM3 (ref 0–0.2)
BASOPHILS NFR BLD AUTO: 0.6 % (ref 0–1.5)
BILIRUB SERPL-MCNC: 0.4 MG/DL (ref 0.2–1.2)
BUN BLD-MCNC: 11 MG/DL (ref 8–23)
BUN/CREAT SERPL: 16.4 (ref 7–25)
CALCIUM SPEC-SCNC: 9.1 MG/DL (ref 8.6–10.5)
CHLORIDE SERPL-SCNC: 101 MMOL/L (ref 98–107)
CHOLEST SERPL-MCNC: 231 MG/DL (ref 0–200)
CO2 SERPL-SCNC: 26.9 MMOL/L (ref 22–29)
CREAT BLD-MCNC: 0.67 MG/DL (ref 0.57–1)
DEPRECATED RDW RBC AUTO: 43.6 FL (ref 37–54)
EOSINOPHIL # BLD AUTO: 0.04 10*3/MM3 (ref 0–0.4)
EOSINOPHIL NFR BLD AUTO: 0.8 % (ref 0.3–6.2)
ERYTHROCYTE [DISTWIDTH] IN BLOOD BY AUTOMATED COUNT: 14.3 % (ref 12.3–15.4)
GFR SERPL CREATININE-BSD FRML MDRD: 89 ML/MIN/1.73
GLOBULIN UR ELPH-MCNC: 2.4 GM/DL
GLUCOSE BLD-MCNC: 82 MG/DL (ref 65–99)
HCT VFR BLD AUTO: 41.7 % (ref 34–46.6)
HDLC SERPL-MCNC: 74 MG/DL (ref 40–60)
HGB BLD-MCNC: 13.9 G/DL (ref 12–15.9)
IMM GRANULOCYTES # BLD AUTO: 0.01 10*3/MM3 (ref 0–0.05)
IMM GRANULOCYTES NFR BLD AUTO: 0.2 % (ref 0–0.5)
LDLC SERPL CALC-MCNC: 142 MG/DL (ref 0–100)
LDLC/HDLC SERPL: 1.92 {RATIO}
LYMPHOCYTES # BLD AUTO: 1.61 10*3/MM3 (ref 0.7–3.1)
LYMPHOCYTES NFR BLD AUTO: 30.5 % (ref 19.6–45.3)
MCH RBC QN AUTO: 28.4 PG (ref 26.6–33)
MCHC RBC AUTO-ENTMCNC: 33.3 G/DL (ref 31.5–35.7)
MCV RBC AUTO: 85.3 FL (ref 79–97)
MONOCYTES # BLD AUTO: 0.36 10*3/MM3 (ref 0.1–0.9)
MONOCYTES NFR BLD AUTO: 6.8 % (ref 5–12)
NEUTROPHILS # BLD AUTO: 3.23 10*3/MM3 (ref 1.7–7)
NEUTROPHILS NFR BLD AUTO: 61.1 % (ref 42.7–76)
NRBC BLD AUTO-RTO: 0 /100 WBC (ref 0–0.2)
PLATELET # BLD AUTO: 256 10*3/MM3 (ref 140–450)
PMV BLD AUTO: 11.2 FL (ref 6–12)
POTASSIUM BLD-SCNC: 4.1 MMOL/L (ref 3.5–5.2)
PROT SERPL-MCNC: 6.6 G/DL (ref 6–8.5)
RBC # BLD AUTO: 4.89 10*6/MM3 (ref 3.77–5.28)
SODIUM BLD-SCNC: 139 MMOL/L (ref 136–145)
T4 FREE SERPL-MCNC: 1.27 NG/DL (ref 0.93–1.7)
TRIGL SERPL-MCNC: 74 MG/DL (ref 0–150)
TSH SERPL DL<=0.05 MIU/L-ACNC: 3.21 UIU/ML (ref 0.27–4.2)
VLDLC SERPL-MCNC: 14.8 MG/DL (ref 5–40)
WBC NRBC COR # BLD: 5.28 10*3/MM3 (ref 3.4–10.8)

## 2020-03-11 PROCEDURE — 99396 PREV VISIT EST AGE 40-64: CPT | Performed by: INTERNAL MEDICINE

## 2020-03-11 PROCEDURE — 80061 LIPID PANEL: CPT | Performed by: INTERNAL MEDICINE

## 2020-03-11 PROCEDURE — 82306 VITAMIN D 25 HYDROXY: CPT | Performed by: INTERNAL MEDICINE

## 2020-03-11 PROCEDURE — 84439 ASSAY OF FREE THYROXINE: CPT | Performed by: INTERNAL MEDICINE

## 2020-03-11 PROCEDURE — 80053 COMPREHEN METABOLIC PANEL: CPT | Performed by: INTERNAL MEDICINE

## 2020-03-11 PROCEDURE — 85025 COMPLETE CBC W/AUTO DIFF WBC: CPT | Performed by: INTERNAL MEDICINE

## 2020-03-11 PROCEDURE — 84443 ASSAY THYROID STIM HORMONE: CPT | Performed by: INTERNAL MEDICINE

## 2020-03-11 RX ORDER — CETIRIZINE HYDROCHLORIDE 10 MG/1
10 TABLET ORAL DAILY
Qty: 90 TABLET | Refills: 0 | Status: SHIPPED | OUTPATIENT
Start: 2020-03-11 | End: 2020-04-14 | Stop reason: SDUPTHER

## 2020-03-11 RX ORDER — UBIDECARENONE 100 MG
100 CAPSULE ORAL DAILY
Qty: 90 CAPSULE | Refills: 3 | Status: SHIPPED | OUTPATIENT
Start: 2020-03-11 | End: 2020-03-27 | Stop reason: SDUPTHER

## 2020-03-11 NOTE — PROGRESS NOTES
Chief Complaint   Patient presents with   • Annual Exam       HPI:  Ester Hernandez is a 64 y.o. female who presents today for annual physical.  She has no acute complaints today.  Chronic medical issues are stable.    ROS:  Constitutional: no fevers, night sweats or unexplained weight loss  Eyes: no vision changes  ENT: no runny nose, ear pain, sore throat  Cardio: no chest pain, palpitations  Pulm: no shortness of breath, wheezing, or cough  GI: no abdominal pain or changes in bowel movements  : no difficulty urinating  MSK: no difficulty ambulating, no joint pain  Neuro: no weakness, dizziness or headache  Psych: no trouble sleeping  Endo: no change in appetite      Past Medical History:   Diagnosis Date   • Abdominal pain    • Acute URI    • Breast cancer (CMS/HCC) 2004    right   • Breast cancer (CMS/HCC)    • Cyst of left upper eyelid    • Hx of radiation therapy    • Immunization deficiency    • Radiation 2004    right   • Seasonal allergies    • Sinusitis       Family History   Problem Relation Age of Onset   • Lung cancer Mother    • Hypertension Father    • Diabetes Father    • COPD Brother    • Breast cancer Neg Hx    • Ovarian cancer Neg Hx       Social History     Socioeconomic History   • Marital status:      Spouse name: Not on file   • Number of children: Not on file   • Years of education: Not on file   • Highest education level: Not on file   Tobacco Use   • Smoking status: Never Smoker   • Smokeless tobacco: Never Used   Substance and Sexual Activity   • Alcohol use: No   • Drug use: No   • Sexual activity: Yes     Partners: Male     Birth control/protection: None     Comment:       Allergies   Allergen Reactions   • Venlafaxine Anxiety     And weakness.     • Amoxicillin    • Azithromycin    • Cephalexin    • Ciprofloxacin    • Clarithromycin    • Doxycycline    • Doxycycline Monohydrate    • Erythromycin    • Levofloxacin    • Loratadine       Immunization History   Administered  Date(s) Administered   • FLUARIX/FLUZONE/AFLURIA/FLULAVAL QUAD 01/09/2019   • Flu Vaccine Quad PF >18YRS 11/03/2016, 01/15/2018   • Tdap 12/20/2010        PE:  Vitals:    03/11/20 1224   BP: 118/84   Pulse: 91   SpO2: 98%      Body mass index is 26.17 kg/m².    Gen Appearance: NAD  HEENT: Normocephalic, PERRLA, no thyromegaly, trache midline  Heart: RRR, normal S1 and S2, no murmur  Lungs: CTA b/l, no wheezing, no crackles  Abdomen: Soft, non-tender, non-distended, no guarding and BSx4  MSK: Moves all extremities well, normal gait, no peripheral edema  Pulses: Palpable and equal b/l  Lymph nodes: No palpable lymphadenopathy   Neuro: No focal deficits      Current Outpatient Medications   Medication Sig Dispense Refill   • amitriptyline (ELAVIL) 10 MG tablet TAKE 1-2 TABLETS BY MOUTH AT BEDTIME 180 tablet 0   • busPIRone (BUSPAR) 10 MG tablet Take 1 tablet by mouth 2 (Two) Times a Day. 60 tablet 2   • cetirizine (zyrTEC) 10 MG tablet Take 1 tablet by mouth Daily. 90 tablet 0   • cholecalciferol (VITAMIN D3) 25 MCG (1000 UT) tablet Take 1 tablet by mouth Daily. 90 tablet 3   • coenzyme Q10 100 MG capsule Take 1 capsule by mouth Daily. 90 capsule 3   • levothyroxine (SYNTHROID, LEVOTHROID) 50 MCG tablet take 1 tablet by mouth once daily 90 tablet 0   • lisinopril (PRINIVIL,ZESTRIL) 20 MG tablet TAKE 1 TABLET BY MOUTH DAILY 90 tablet 1   • ondansetron (ZOFRAN) 4 MG tablet Take 1 tablet by mouth Every 8 (Eight) Hours As Needed for Nausea or Vomiting. 12 tablet 0   • Probiotic Product (PROBIOTIC DAILY PO) Take  by mouth.     • saccharomyces boulardii (FLORASTOR) 250 MG capsule Take 1 capsule by mouth Daily. 90 capsule 3     No current facility-administered medications for this visit.         Ester was seen today for annual exam.    Diagnoses and all orders for this visit:    Preventative health care  Counseled on healthy weight, nutrition, physical activity, cancer screening, and immunizations.  Up-to-date on mammogram  and colonoscopy.  She plans on following up with gynecology as well as DEXA scan.  She has history of osteopenia.  Checking screening blood work.    Hypothyroidism due to Hashimoto's thyroiditis  -     TSH+Free T4    Essential hypertension  -     CBC & Differential  -     Comprehensive Metabolic Panel  -     Lipid Panel  -     CBC Auto Differential    Vitamin D deficiency  -     Vitamin D 25 Hydroxy    Other orders  -     coenzyme Q10 100 MG capsule; Take 1 capsule by mouth Daily.  -     cetirizine (zyrTEC) 10 MG tablet; Take 1 tablet by mouth Daily.         Return in about 1 year (around 3/11/2021) for Annual.     Please note that portions of this document were completed with a voice recognition program. Efforts were made to edit the dictations, but occasionally words are mis-transcribed.

## 2020-03-23 ENCOUNTER — TELEPHONE (OUTPATIENT)
Dept: FAMILY MEDICINE CLINIC | Facility: CLINIC | Age: 65
End: 2020-03-23

## 2020-03-23 NOTE — TELEPHONE ENCOUNTER
Her lab work results are as below:    Vitamin D is good, glucose kidney function liver function electrolytes are all normal, and her thyroid is all normal.  Her lipid panel has been abnormal for the past 3 years but is not enough to put you on any medication at this time.  I recommend a low-fat low-cholesterol diet with exercise 30 to 60 minutes 3 times a week.

## 2020-03-23 NOTE — TELEPHONE ENCOUNTER
PATIENT CALLED AND STATED SHE HAD LAB WORK DONE ON 03/11/2020 AND WAS TOLD SHE WAS GOING TO RECEIVE A LETTER REGARDING HER RESULTS AND WHAT THEY MEAN. SHE STATES THAT SHE HAS NOT RECEIVED THE LETTER.  THE PATIENT WOULD LIKE TO KNOW IF SOMETHING HAS HAPPENED OR IF SOMETHING ELSE NEEDS TO BE DONE PLEASE ADVISE.     PATIENT CALL BACK [phone]

## 2020-03-27 ENCOUNTER — TELEPHONE (OUTPATIENT)
Dept: FAMILY MEDICINE CLINIC | Facility: CLINIC | Age: 65
End: 2020-03-27

## 2020-03-27 RX ORDER — MELATONIN
1000 DAILY
Qty: 90 TABLET | Refills: 3 | Status: SHIPPED | OUTPATIENT
Start: 2020-03-27 | End: 2020-03-30 | Stop reason: SDUPTHER

## 2020-03-27 RX ORDER — ZINC OXIDE 13 %
1 CREAM (GRAM) TOPICAL DAILY
Qty: 30 CAPSULE | Refills: 0 | Status: SHIPPED | OUTPATIENT
Start: 2020-03-27 | End: 2020-03-30 | Stop reason: SDUPTHER

## 2020-03-27 RX ORDER — UBIDECARENONE 100 MG
100 CAPSULE ORAL DAILY
Qty: 90 CAPSULE | Refills: 3 | Status: SHIPPED | OUTPATIENT
Start: 2020-03-27 | End: 2020-03-30 | Stop reason: SDUPTHER

## 2020-03-27 NOTE — TELEPHONE ENCOUNTER
Contacted patient she stated that she needs the OTC meds signed on a script for reimbursment from her flexplan, I advised her Dr. Chapman is not available until Monday she stated it could be any provider     She also requested her last OV noted mailed to her.

## 2020-03-27 NOTE — TELEPHONE ENCOUNTER
PT CALLED ASKING IF HER OVER THE COUNTER MEDICATIONS COULD BE WRITTEN ON THE DR'S PRESCRIPTION PAD SO THAT SHE CAN TURN THEM IN FOR REIMBURSTMENT FOR HER FLEXPLAN.    cholecalciferol (VITAMIN D3) 25 MCG (1000 UT) tablet    Probiotic Product (PROBIOTIC DAILY PO)    coenzyme Q10 100 MG capsule    PT ASKED THAT THEY BE MAILED TO HER.    PC CONTACT [phone]     PLEASE ADVISE

## 2020-03-30 RX ORDER — UBIDECARENONE 100 MG
100 CAPSULE ORAL DAILY
Qty: 90 CAPSULE | Refills: 3 | Status: SHIPPED | OUTPATIENT
Start: 2020-03-30

## 2020-03-30 RX ORDER — MELATONIN
1000 DAILY
Qty: 90 TABLET | Refills: 3 | Status: SHIPPED | OUTPATIENT
Start: 2020-03-30

## 2020-03-30 RX ORDER — ZINC OXIDE 13 %
1 CREAM (GRAM) TOPICAL DAILY
Qty: 90 CAPSULE | Refills: 3 | Status: SHIPPED | OUTPATIENT
Start: 2020-03-30

## 2020-04-10 DIAGNOSIS — E03.9 HYPOTHYROIDISM, UNSPECIFIED TYPE: ICD-10-CM

## 2020-04-10 RX ORDER — LEVOTHYROXINE SODIUM 0.05 MG/1
50 TABLET ORAL DAILY
Qty: 30 TABLET | Refills: 0 | Status: SHIPPED | OUTPATIENT
Start: 2020-04-10 | End: 2020-05-07

## 2020-04-10 RX ORDER — LEVOTHYROXINE SODIUM 0.05 MG/1
50 TABLET ORAL DAILY
Qty: 90 TABLET | Refills: 1 | Status: SHIPPED | OUTPATIENT
Start: 2020-04-10 | End: 2020-04-10 | Stop reason: SDUPTHER

## 2020-04-10 NOTE — TELEPHONE ENCOUNTER
PATIENT CALLED REQUESING REFILL FOR.. STATES SHE IS OUT OF THIS MEDICATION    levothyroxine (SYNTHROID, LEVOTHROID) 50 MCG tablet    PATIENT CALLED  [facility] AND WAS TOLD AN ORDER FOR MEDS HAS NOT BEEN RECEIVED    PHARMACY    [id number] - [facility] [address] [facility] [address] - [phone] [facility] [phone]       PATIENT PHONE NUMBER    [phone]    FOR FUTURE REFILLS     USE PHARMACY    [facility] - [place], [place] - [address] [place] [address] - [phone] [facility] [phone] [facility]    FAX NUMBER [phone]

## 2020-04-14 DIAGNOSIS — F41.9 ANXIETY: ICD-10-CM

## 2020-04-14 DIAGNOSIS — E03.9 HYPOTHYROIDISM, UNSPECIFIED TYPE: ICD-10-CM

## 2020-04-14 RX ORDER — BUSPIRONE HYDROCHLORIDE 10 MG/1
10 TABLET ORAL 2 TIMES DAILY
Qty: 60 TABLET | Refills: 2 | Status: SHIPPED | OUTPATIENT
Start: 2020-04-14 | End: 2020-08-25

## 2020-04-14 RX ORDER — LISINOPRIL 20 MG/1
20 TABLET ORAL DAILY
Qty: 90 TABLET | Refills: 1 | Status: SHIPPED | OUTPATIENT
Start: 2020-04-14 | End: 2020-10-15

## 2020-04-14 RX ORDER — CETIRIZINE HYDROCHLORIDE 10 MG/1
10 TABLET ORAL DAILY
Qty: 90 TABLET | Refills: 0 | Status: SHIPPED | OUTPATIENT
Start: 2020-04-14 | End: 2020-06-03

## 2020-04-14 RX ORDER — BUSPIRONE HYDROCHLORIDE 10 MG/1
10 TABLET ORAL 2 TIMES DAILY
Qty: 60 TABLET | Refills: 2 | Status: SHIPPED | OUTPATIENT
Start: 2020-04-14 | End: 2020-04-14 | Stop reason: SDUPTHER

## 2020-05-07 DIAGNOSIS — E03.9 HYPOTHYROIDISM, UNSPECIFIED TYPE: ICD-10-CM

## 2020-05-07 RX ORDER — LEVOTHYROXINE SODIUM 0.05 MG/1
TABLET ORAL
Qty: 30 TABLET | Refills: 5 | Status: SHIPPED | OUTPATIENT
Start: 2020-05-07 | End: 2020-09-19

## 2020-05-27 DIAGNOSIS — G47.00 INSOMNIA, UNSPECIFIED TYPE: ICD-10-CM

## 2020-05-27 RX ORDER — AMITRIPTYLINE HYDROCHLORIDE 10 MG/1
TABLET, FILM COATED ORAL
Qty: 180 TABLET | Refills: 0 | Status: SHIPPED | OUTPATIENT
Start: 2020-05-27 | End: 2020-08-31

## 2020-06-03 RX ORDER — CETIRIZINE HYDROCHLORIDE 10 MG/1
10 TABLET ORAL DAILY
Qty: 90 TABLET | Refills: 0 | Status: SHIPPED | OUTPATIENT
Start: 2020-06-03 | End: 2020-09-21

## 2020-08-31 DIAGNOSIS — G47.00 INSOMNIA, UNSPECIFIED TYPE: ICD-10-CM

## 2020-08-31 RX ORDER — AMITRIPTYLINE HYDROCHLORIDE 10 MG/1
TABLET, FILM COATED ORAL
Qty: 180 TABLET | Refills: 0 | Status: SHIPPED | OUTPATIENT
Start: 2020-08-31 | End: 2020-12-08 | Stop reason: SDUPTHER

## 2020-09-19 DIAGNOSIS — E03.9 HYPOTHYROIDISM, UNSPECIFIED TYPE: ICD-10-CM

## 2020-09-19 RX ORDER — LEVOTHYROXINE SODIUM 0.05 MG/1
TABLET ORAL
Qty: 90 TABLET | Refills: 1 | Status: SHIPPED | OUTPATIENT
Start: 2020-09-19 | End: 2021-03-19 | Stop reason: SDUPTHER

## 2020-09-21 RX ORDER — CETIRIZINE HYDROCHLORIDE 10 MG/1
TABLET ORAL
Qty: 90 TABLET | Refills: 3 | Status: SHIPPED | OUTPATIENT
Start: 2020-09-21 | End: 2021-11-01

## 2020-10-15 DIAGNOSIS — E03.9 HYPOTHYROIDISM, UNSPECIFIED TYPE: ICD-10-CM

## 2020-10-15 RX ORDER — LISINOPRIL 20 MG/1
TABLET ORAL
Qty: 90 TABLET | Refills: 3 | Status: SHIPPED | OUTPATIENT
Start: 2020-10-15 | End: 2021-10-11

## 2020-12-08 DIAGNOSIS — G47.00 INSOMNIA, UNSPECIFIED TYPE: ICD-10-CM

## 2020-12-08 NOTE — TELEPHONE ENCOUNTER
Med refill  Amitriptyline 10 mg Q: 180 tablets    Patient technically in protocol.  Last OV 3/11/20 note stated patient can come back in 1 year.  Please advise on refill.

## 2020-12-09 RX ORDER — AMITRIPTYLINE HYDROCHLORIDE 10 MG/1
10-20 TABLET, FILM COATED ORAL
Qty: 180 TABLET | Refills: 0 | Status: SHIPPED | OUTPATIENT
Start: 2020-12-09 | End: 2021-03-19 | Stop reason: SDUPTHER

## 2021-01-29 ENCOUNTER — TRANSCRIBE ORDERS (OUTPATIENT)
Dept: ADMINISTRATIVE | Facility: HOSPITAL | Age: 66
End: 2021-01-29

## 2021-01-29 DIAGNOSIS — Z12.31 VISIT FOR SCREENING MAMMOGRAM: Primary | ICD-10-CM

## 2021-02-25 ENCOUNTER — HOSPITAL ENCOUNTER (OUTPATIENT)
Dept: MAMMOGRAPHY | Facility: HOSPITAL | Age: 66
Discharge: HOME | End: 2021-02-25
Admitting: INTERNAL MEDICINE

## 2021-02-25 DIAGNOSIS — Z12.31 VISIT FOR SCREENING MAMMOGRAM: ICD-10-CM

## 2021-02-25 PROCEDURE — 77063 BREAST TOMOSYNTHESIS BI: CPT | Performed by: RADIOLOGY

## 2021-02-25 PROCEDURE — 77063 BREAST TOMOSYNTHESIS BI: CPT

## 2021-02-25 PROCEDURE — 77067 SCR MAMMO BI INCL CAD: CPT

## 2021-02-25 PROCEDURE — 77067 SCR MAMMO BI INCL CAD: CPT | Performed by: RADIOLOGY

## 2021-03-18 DIAGNOSIS — E03.9 HYPOTHYROIDISM, UNSPECIFIED TYPE: ICD-10-CM

## 2021-03-18 RX ORDER — LEVOTHYROXINE SODIUM 0.05 MG/1
TABLET ORAL
Qty: 90 TABLET | Refills: 3 | OUTPATIENT
Start: 2021-03-18

## 2021-03-18 NOTE — TELEPHONE ENCOUNTER
Attempted to contact patient, no answer. Left voicemail for patient to call the office back and schedule an appt for further refills.    Hub may relay message and schedule appointment.

## 2021-03-18 NOTE — TELEPHONE ENCOUNTER
LOV:03/11/2020  NOV:Not scheduled  Last fill:09/19/2020    Pt needs a visit, will call to schedule later.

## 2021-03-19 ENCOUNTER — OFFICE VISIT (OUTPATIENT)
Dept: FAMILY MEDICINE CLINIC | Facility: CLINIC | Age: 66
End: 2021-03-19

## 2021-03-19 VITALS
HEART RATE: 105 BPM | HEIGHT: 61 IN | BODY MASS INDEX: 26.06 KG/M2 | OXYGEN SATURATION: 98 % | WEIGHT: 138 LBS | DIASTOLIC BLOOD PRESSURE: 80 MMHG | SYSTOLIC BLOOD PRESSURE: 122 MMHG

## 2021-03-19 DIAGNOSIS — G47.00 INSOMNIA, UNSPECIFIED TYPE: ICD-10-CM

## 2021-03-19 DIAGNOSIS — I10 ESSENTIAL HYPERTENSION: ICD-10-CM

## 2021-03-19 DIAGNOSIS — E03.9 HYPOTHYROIDISM, UNSPECIFIED TYPE: ICD-10-CM

## 2021-03-19 DIAGNOSIS — Z00.00 PREVENTATIVE HEALTH CARE: Primary | ICD-10-CM

## 2021-03-19 DIAGNOSIS — E55.9 VITAMIN D DEFICIENCY: ICD-10-CM

## 2021-03-19 PROCEDURE — 99396 PREV VISIT EST AGE 40-64: CPT | Performed by: INTERNAL MEDICINE

## 2021-03-19 RX ORDER — LEVOTHYROXINE SODIUM 0.05 MG/1
50 TABLET ORAL DAILY
Qty: 90 TABLET | Refills: 1 | Status: SHIPPED | OUTPATIENT
Start: 2021-03-19 | End: 2021-05-28 | Stop reason: SDUPTHER

## 2021-03-19 RX ORDER — AMITRIPTYLINE HYDROCHLORIDE 10 MG/1
10-20 TABLET, FILM COATED ORAL
Qty: 180 TABLET | Refills: 0 | Status: SHIPPED | OUTPATIENT
Start: 2021-03-19 | End: 2021-10-26

## 2021-03-19 NOTE — PROGRESS NOTES
Chief Complaint   Patient presents with   • Thyroid Problem     discuss med refills    • Stress      dx with leukemia        HPI:  Ester Hernandez is a 65 y.o. female who presents today for annual checkup.  She is under a significant amount of stress recently due to her 's diagnosis of leukemia.    ROS:  Constitutional: no fevers, night sweats or unexplained weight loss  Eyes: no vision changes  ENT: no runny nose, ear pain, sore throat  Cardio: no chest pain, palpitations  Pulm: no shortness of breath, wheezing, or cough  GI: no abdominal pain or changes in bowel movements  : no difficulty urinating  MSK: no difficulty ambulating, no joint pain  Neuro: no weakness, dizziness or headache  Psych: no trouble sleeping  Endo: no change in appetite      Past Medical History:   Diagnosis Date   • Abdominal pain    • Acute URI    • Breast cancer (CMS/HCC) 2004    right   • Breast cancer (CMS/HCC)    • Cyst of left upper eyelid    • Hx of radiation therapy    • Immunization deficiency    • Radiation 2004    right   • Seasonal allergies    • Sinusitis       Family History   Problem Relation Age of Onset   • Lung cancer Mother    • Hypertension Father    • Diabetes Father    • COPD Brother    • Breast cancer Neg Hx    • Ovarian cancer Neg Hx       Social History     Socioeconomic History   • Marital status:      Spouse name: Not on file   • Number of children: Not on file   • Years of education: Not on file   • Highest education level: Not on file   Tobacco Use   • Smoking status: Never Smoker   • Smokeless tobacco: Never Used   Substance and Sexual Activity   • Alcohol use: No   • Drug use: No   • Sexual activity: Yes     Partners: Male     Birth control/protection: None     Comment:       Allergies   Allergen Reactions   • Venlafaxine Anxiety     And weakness.     • Amoxicillin    • Azithromycin    • Cephalexin    • Ciprofloxacin    • Clarithromycin    • Doxycycline    • Doxycycline Monohydrate     • Erythromycin    • Levofloxacin    • Loratadine       Immunization History   Administered Date(s) Administered   • Flu Vaccine Quad PF >18YRS 11/03/2016, 01/15/2018   • Flu Vaccine Quad PF >36MO 01/09/2019   • Flulaval/Fluarix/Fluzone Quad 01/09/2019   • Influenza Quad Vaccine (Inpatient) 01/15/2018   • Tdap 12/20/2010, 08/25/2020        PE:  Vitals:    03/19/21 0939   BP: 122/80   Pulse: 105   SpO2: 98%      Body mass index is 26.07 kg/m².    Gen Appearance: NAD  HEENT: Normocephalic, PERRLA, no thyromegaly, trache midline  Heart: RRR, normal S1 and S2, no murmur  Lungs: CTA b/l, no wheezing, no crackles  Abdomen: Soft, non-tender, non-distended, no guarding and BSx4  MSK: Moves all extremities well, normal gait, no peripheral edema  Pulses: Palpable and equal b/l  Lymph nodes: No palpable lymphadenopathy   Neuro: No focal deficits      Current Outpatient Medications   Medication Sig Dispense Refill   • amitriptyline (ELAVIL) 10 MG tablet Take 1-2 tablets by mouth every night at bedtime. 180 tablet 0   • cetirizine (zyrTEC) 10 MG tablet TAKE 1 TABLET DAILY 90 tablet 3   • cholecalciferol (VITAMIN D3) 25 MCG (1000 UT) tablet Take 1 tablet by mouth Daily. 90 tablet 3   • clindamycin (CLEOCIN) 150 MG capsule Take 1 capsule by mouth 3 (Three) Times a Day. 30 capsule 0   • coenzyme Q10 100 MG capsule Take 1 capsule by mouth Daily. 90 capsule 3   • levothyroxine (SYNTHROID, LEVOTHROID) 50 MCG tablet Take 1 tablet by mouth Daily. 90 tablet 1   • lisinopril (PRINIVIL,ZESTRIL) 20 MG tablet TAKE 1 TABLET DAILY 90 tablet 3   • ondansetron (ZOFRAN) 4 MG tablet Take 1 tablet by mouth Every 8 (Eight) Hours As Needed for Nausea or Vomiting. 12 tablet 0   • Probiotic Product (PROBIOTIC DAILY) capsule Take 1 each by mouth Daily. 90 capsule 3     No current facility-administered medications for this visit.      Would recommend short course of low-dose Xanax due to her 's recent diagnosis.  Patient states she will call  clinic if this is necessary.  She would like to try without medication initially.    Diagnoses and all orders for this visit:    1. Essential hypertension (Primary)  Well-controlled today.  Continue current medication.  2. Insomnia, unspecified type  -     amitriptyline (ELAVIL) 10 MG tablet; Take 1-2 tablets by mouth every night at bedtime.  Dispense: 180 tablet; Refill: 0    3. Hypothyroidism, unspecified type  -     levothyroxine (SYNTHROID, LEVOTHROID) 50 MCG tablet; Take 1 tablet by mouth Daily.  Dispense: 90 tablet; Refill: 1    4. Vitamin D deficiency    5. Preventative health care  -     CBC & Differential; Future  -     Comprehensive Metabolic Panel; Future  -     Hemoglobin A1c; Future  -     Lipid Panel; Future  -     TSH+Free T4; Future  -     Vitamin D 25 Hydroxy; Future  Counseled on healthy weight, nutrition, physical activity, cancer screening, and immunizations.         No follow-ups on file.     Please note that portions of this document were completed with a voice recognition program. Efforts were made to edit the dictations, but occasionally words are mis-transcribed.

## 2021-05-28 DIAGNOSIS — E03.9 HYPOTHYROIDISM, UNSPECIFIED TYPE: ICD-10-CM

## 2021-06-01 RX ORDER — LEVOTHYROXINE SODIUM 0.05 MG/1
50 TABLET ORAL DAILY
Qty: 90 TABLET | Refills: 1 | Status: SHIPPED | OUTPATIENT
Start: 2021-06-01 | End: 2021-11-12 | Stop reason: SDUPTHER

## 2021-06-23 ENCOUNTER — TELEPHONE (OUTPATIENT)
Dept: FAMILY MEDICINE CLINIC | Facility: CLINIC | Age: 66
End: 2021-06-23

## 2021-06-23 NOTE — TELEPHONE ENCOUNTER
Caller: Ester Hernandez    Relationship: Self    Best call back number: [phone]    What medication are you requesting: ANTIBIOTIC FOR URINARY TRACT INFECTION    What are your current symptoms: FREQUENT URINATION AND BURNING    How long have you been experiencing symptoms: LAST NIGHT     Have you had these symptoms before:    [x] Yes  [] No    Have you been treated for these symptoms before:   [x] Yes  [] No    If a prescription is needed, what is your preferred pharmacy and phone number:    [facility] #[id number] [place], [place] - [address] - [phone]      Additional notes:  PATIENT IS ALLERGIC TO SEVERAL ANTIBIOTICS AND WOULD LIKE TO KNOW WHAT THOSE ANTIBIOTICS ARE BECAUSE DAUGHTER HAS SOME ANTIBIOTICS AND WAS GOING TO TAKE THOSE BUT IS CONCERNED ABOUT ALLERGIC REACTION.      PLEASE CALL AND ADVISE

## 2021-06-23 NOTE — TELEPHONE ENCOUNTER
Attempted to call pt to schedule appointment, received no answer. Left  with office number given.     OKAY TO SCHEDULE APPOINTMENT FOR UTI

## 2021-06-24 NOTE — TELEPHONE ENCOUNTER
I called patient, who stated that she is fine and does not need to come in for evaluation. She just wanted to update her pharmacy with her allergies list and has completed that already. She is going on a vacation and when she returns will complete her labs that were previously ordered as well.

## 2021-07-09 ENCOUNTER — LAB (OUTPATIENT)
Dept: LAB | Facility: HOSPITAL | Age: 66
End: 2021-07-09

## 2021-07-09 DIAGNOSIS — Z00.00 PREVENTATIVE HEALTH CARE: ICD-10-CM

## 2021-07-09 LAB
25(OH)D3 SERPL-MCNC: 36.5 NG/ML
ALBUMIN SERPL-MCNC: 4.1 G/DL (ref 3.5–5.2)
ALBUMIN/GLOB SERPL: 1.9 G/DL
ALP SERPL-CCNC: 68 U/L (ref 39–117)
ALT SERPL W P-5'-P-CCNC: 15 U/L (ref 1–33)
ANION GAP SERPL CALCULATED.3IONS-SCNC: 7.2 MMOL/L (ref 5–15)
AST SERPL-CCNC: 19 U/L (ref 1–32)
BASOPHILS # BLD AUTO: 0.04 10*3/MM3 (ref 0–0.2)
BASOPHILS NFR BLD AUTO: 0.8 % (ref 0–1.5)
BILIRUB SERPL-MCNC: 0.3 MG/DL (ref 0–1.2)
BUN SERPL-MCNC: 7 MG/DL (ref 8–23)
BUN/CREAT SERPL: 10.4 (ref 7–25)
CALCIUM SPEC-SCNC: 8.6 MG/DL (ref 8.6–10.5)
CHLORIDE SERPL-SCNC: 100 MMOL/L (ref 98–107)
CHOLEST SERPL-MCNC: 221 MG/DL (ref 0–200)
CO2 SERPL-SCNC: 27.8 MMOL/L (ref 22–29)
CREAT SERPL-MCNC: 0.67 MG/DL (ref 0.57–1)
DEPRECATED RDW RBC AUTO: 42.3 FL (ref 37–54)
EOSINOPHIL # BLD AUTO: 0.05 10*3/MM3 (ref 0–0.4)
EOSINOPHIL NFR BLD AUTO: 1 % (ref 0.3–6.2)
ERYTHROCYTE [DISTWIDTH] IN BLOOD BY AUTOMATED COUNT: 13.1 % (ref 12.3–15.4)
GFR SERPL CREATININE-BSD FRML MDRD: 89 ML/MIN/1.73
GLOBULIN UR ELPH-MCNC: 2.2 GM/DL
GLUCOSE SERPL-MCNC: 86 MG/DL (ref 65–99)
HBA1C MFR BLD: 5.59 % (ref 4.8–5.6)
HCT VFR BLD AUTO: 40.8 % (ref 34–46.6)
HDLC SERPL-MCNC: 57 MG/DL (ref 40–60)
HGB BLD-MCNC: 13.6 G/DL (ref 12–15.9)
IMM GRANULOCYTES # BLD AUTO: 0.01 10*3/MM3 (ref 0–0.05)
IMM GRANULOCYTES NFR BLD AUTO: 0.2 % (ref 0–0.5)
LDLC SERPL CALC-MCNC: 151 MG/DL (ref 0–100)
LDLC/HDLC SERPL: 2.61 {RATIO}
LYMPHOCYTES # BLD AUTO: 1.34 10*3/MM3 (ref 0.7–3.1)
LYMPHOCYTES NFR BLD AUTO: 26.1 % (ref 19.6–45.3)
MCH RBC QN AUTO: 29.4 PG (ref 26.6–33)
MCHC RBC AUTO-ENTMCNC: 33.3 G/DL (ref 31.5–35.7)
MCV RBC AUTO: 88.3 FL (ref 79–97)
MONOCYTES # BLD AUTO: 0.43 10*3/MM3 (ref 0.1–0.9)
MONOCYTES NFR BLD AUTO: 8.4 % (ref 5–12)
NEUTROPHILS NFR BLD AUTO: 3.27 10*3/MM3 (ref 1.7–7)
NEUTROPHILS NFR BLD AUTO: 63.5 % (ref 42.7–76)
NRBC BLD AUTO-RTO: 0 /100 WBC (ref 0–0.2)
PLATELET # BLD AUTO: 318 10*3/MM3 (ref 140–450)
PMV BLD AUTO: 10.8 FL (ref 6–12)
POTASSIUM SERPL-SCNC: 4.6 MMOL/L (ref 3.5–5.2)
PROT SERPL-MCNC: 6.3 G/DL (ref 6–8.5)
RBC # BLD AUTO: 4.62 10*6/MM3 (ref 3.77–5.28)
SODIUM SERPL-SCNC: 135 MMOL/L (ref 136–145)
T4 FREE SERPL-MCNC: 1.38 NG/DL (ref 0.93–1.7)
TRIGL SERPL-MCNC: 76 MG/DL (ref 0–150)
TSH SERPL DL<=0.05 MIU/L-ACNC: 1.21 UIU/ML (ref 0.27–4.2)
VLDLC SERPL-MCNC: 13 MG/DL (ref 5–40)
WBC # BLD AUTO: 5.14 10*3/MM3 (ref 3.4–10.8)

## 2021-07-09 PROCEDURE — 85025 COMPLETE CBC W/AUTO DIFF WBC: CPT

## 2021-07-09 PROCEDURE — 84439 ASSAY OF FREE THYROXINE: CPT

## 2021-07-09 PROCEDURE — 80061 LIPID PANEL: CPT

## 2021-07-09 PROCEDURE — 82306 VITAMIN D 25 HYDROXY: CPT

## 2021-07-09 PROCEDURE — 80053 COMPREHEN METABOLIC PANEL: CPT

## 2021-07-09 PROCEDURE — 84443 ASSAY THYROID STIM HORMONE: CPT

## 2021-07-09 PROCEDURE — 83036 HEMOGLOBIN GLYCOSYLATED A1C: CPT

## 2021-07-15 ENCOUNTER — TELEPHONE (OUTPATIENT)
Dept: FAMILY MEDICINE CLINIC | Facility: CLINIC | Age: 66
End: 2021-07-15

## 2021-07-15 NOTE — TELEPHONE ENCOUNTER
Caller: Ester Hernandez    Relationship: Self    Best call back number: [phone]    Caller requesting test results: SELF    What test was performed: LABS    When was the test performed: 7/21    Where was the test performed: [facility]    Additional notes:

## 2021-07-15 NOTE — TELEPHONE ENCOUNTER
Contacted patient and relayed PCP's message from lab results. Pt verbalized understanding and did not have any additional questions at this time.        Blood test results are stable.  Sodium is a bit low although this is consistent with your history.  We will keep an eye on this in the future.  No changes needed at this time.     If you have any specific questions or concerns please let me know

## 2021-10-10 DIAGNOSIS — E03.9 HYPOTHYROIDISM, UNSPECIFIED TYPE: ICD-10-CM

## 2021-10-11 RX ORDER — LISINOPRIL 20 MG/1
TABLET ORAL
Qty: 90 TABLET | Refills: 3 | Status: SHIPPED | OUTPATIENT
Start: 2021-10-11 | End: 2022-10-05

## 2021-10-11 NOTE — TELEPHONE ENCOUNTER
Rx Refill Note  Requested Prescriptions     Pending Prescriptions Disp Refills   • lisinopril (PRINIVIL,ZESTRIL) 20 MG tablet [Pharmacy Med Name: LISINOPRIL TABS 20MG] 90 tablet 3     Sig: TAKE 1 TABLET DAILY      Last office visit with prescribing clinician: 3/19/2021      Next office visit with prescribing clinician: Visit date not found            Samanta Cooley MA  10/11/21, 11:46 EDT     Left  for patient to return call. She needs an appointment.    HUB may relay message and schedule patient

## 2021-10-26 DIAGNOSIS — G47.00 INSOMNIA, UNSPECIFIED TYPE: ICD-10-CM

## 2021-10-26 RX ORDER — AMITRIPTYLINE HYDROCHLORIDE 10 MG/1
TABLET, FILM COATED ORAL
Qty: 180 TABLET | Refills: 0 | Status: SHIPPED | OUTPATIENT
Start: 2021-10-26 | End: 2022-02-02 | Stop reason: SDUPTHER

## 2021-10-26 NOTE — TELEPHONE ENCOUNTER
Rx Refill Note  Requested Prescriptions     Pending Prescriptions Disp Refills   • amitriptyline (ELAVIL) 10 MG tablet [Pharmacy Med Name: AMITRIPTYLINE 10MG TABLETS] 180 tablet 0     Sig: TAKE 1 TO 2 TABLETS BY MOUTH EVERY NIGHT AT BEDTIME      Last office visit with prescribing clinician: 3/19/2021      Next office visit with prescribing clinician: Visit date not found            JEMMA LÓPEZ MA  10/26/21, 08:13 EDT

## 2021-11-01 RX ORDER — CETIRIZINE HYDROCHLORIDE 10 MG/1
TABLET ORAL
Qty: 90 TABLET | Refills: 0 | Status: SHIPPED | OUTPATIENT
Start: 2021-11-01 | End: 2022-05-17 | Stop reason: SDUPTHER

## 2021-11-01 NOTE — TELEPHONE ENCOUNTER
Rx Refill Note  Requested Prescriptions     Pending Prescriptions Disp Refills   • cetirizine (zyrTEC) 10 MG tablet [Pharmacy Med Name: CETIRIZINE TABS-OTC 10MG] 90 tablet 3     Sig: TAKE 1 TABLET DAILY      Last office visit with prescribing clinician: 3/19/2021      Next office visit with prescribing clinician: Visit date not found   3}  Preeti Clark MA  11/01/21, 09:22 EDT     Last fill: 09/21/2020

## 2021-11-12 ENCOUNTER — OFFICE VISIT (OUTPATIENT)
Dept: FAMILY MEDICINE CLINIC | Facility: CLINIC | Age: 66
End: 2021-11-12

## 2021-11-12 VITALS
OXYGEN SATURATION: 97 % | HEART RATE: 88 BPM | WEIGHT: 136 LBS | SYSTOLIC BLOOD PRESSURE: 130 MMHG | TEMPERATURE: 98 F | HEIGHT: 61 IN | BODY MASS INDEX: 25.68 KG/M2 | DIASTOLIC BLOOD PRESSURE: 64 MMHG

## 2021-11-12 DIAGNOSIS — J32.9 RECURRENT SINUSITIS: Primary | ICD-10-CM

## 2021-11-12 DIAGNOSIS — E03.9 HYPOTHYROIDISM, UNSPECIFIED TYPE: ICD-10-CM

## 2021-11-12 DIAGNOSIS — R51.9 NONINTRACTABLE HEADACHE, UNSPECIFIED CHRONICITY PATTERN, UNSPECIFIED HEADACHE TYPE: ICD-10-CM

## 2021-11-12 PROCEDURE — U0004 COV-19 TEST NON-CDC HGH THRU: HCPCS | Performed by: INTERNAL MEDICINE

## 2021-11-12 PROCEDURE — 99214 OFFICE O/P EST MOD 30 MIN: CPT | Performed by: INTERNAL MEDICINE

## 2021-11-12 PROCEDURE — 96372 THER/PROPH/DIAG INJ SC/IM: CPT | Performed by: INTERNAL MEDICINE

## 2021-11-12 RX ORDER — PREDNISONE 20 MG/1
40 TABLET ORAL DAILY
Qty: 10 TABLET | Refills: 0 | Status: SHIPPED | OUTPATIENT
Start: 2021-11-12 | End: 2021-11-17

## 2021-11-12 RX ORDER — LEVOTHYROXINE SODIUM 0.05 MG/1
50 TABLET ORAL DAILY
Qty: 90 TABLET | Refills: 3 | Status: SHIPPED | OUTPATIENT
Start: 2021-11-12 | End: 2022-10-14

## 2021-11-12 RX ORDER — METHYLPREDNISOLONE SODIUM SUCCINATE 40 MG/ML
40 INJECTION, POWDER, LYOPHILIZED, FOR SOLUTION INTRAMUSCULAR; INTRAVENOUS ONCE
Status: COMPLETED | OUTPATIENT
Start: 2021-11-12 | End: 2021-11-12

## 2021-11-12 RX ADMIN — METHYLPREDNISOLONE SODIUM SUCCINATE 40 MG: 40 INJECTION, POWDER, LYOPHILIZED, FOR SOLUTION INTRAMUSCULAR; INTRAVENOUS at 12:10

## 2021-11-12 NOTE — PROGRESS NOTES
Chief Complaint   Patient presents with   • Cough     pt. states cough & congestion started 11/5/21   • Nasal Congestion   • Thyroid Problem       HPI:  Ester Hernandez is a 65 y.o. female who presents today for cough congestion and sinus pressure for the last 7 days.    ROS:  Constitutional: no fevers, night sweats or unexplained weight loss  Eyes: no vision changes  ENT: no runny nose, ear pain, sore throat  Cardio: no chest pain, palpitations  Pulm: no shortness of breath, wheezing, + cough  GI: no abdominal pain or changes in bowel movements  : no difficulty urinating  MSK: no difficulty ambulating, no joint pain  Neuro: no weakness, dizziness or headache  Psych: no trouble sleeping  Endo: no change in appetite      Past Medical History:   Diagnosis Date   • Abdominal pain    • Acute URI    • Breast cancer (HCC) 2004    right   • Breast cancer (HCC)    • Cyst of left upper eyelid    • Hx of radiation therapy    • Immunization deficiency    • Radiation 2004    right   • Seasonal allergies    • Sinusitis       Family History   Problem Relation Age of Onset   • Lung cancer Mother    • Hypertension Father    • Diabetes Father    • COPD Brother    • Breast cancer Neg Hx    • Ovarian cancer Neg Hx       Social History     Socioeconomic History   • Marital status:    Tobacco Use   • Smoking status: Never Smoker   • Smokeless tobacco: Never Used   Substance and Sexual Activity   • Alcohol use: No   • Drug use: No   • Sexual activity: Yes     Partners: Male     Birth control/protection: None     Comment:       Allergies   Allergen Reactions   • Venlafaxine Anxiety     And weakness.     • Amoxicillin    • Azithromycin    • Cephalexin    • Ciprofloxacin    • Clarithromycin    • Doxycycline    • Doxycycline Monohydrate    • Erythromycin    • Levofloxacin    • Loratadine       Immunization History   Administered Date(s) Administered   • COVID-19 (PFIZER) 03/23/2021, 04/24/2021   • Flu Vaccine Quad PF >18YRS  11/03/2016, 01/15/2018   • Flu Vaccine Quad PF >36MO 01/09/2019   • FluLaval/Fluarix/Fluzone >6 01/09/2019, 11/09/2020   • Influenza Quad Vaccine (Inpatient) 01/15/2018   • Tdap 12/20/2010, 08/25/2020        PE:  Vitals:    11/12/21 1132   BP: 130/64   Pulse: 88   Temp: 98 °F (36.7 °C)   SpO2: 97%      Body mass index is 25.7 kg/m².    Gen Appearance: NAD  HEENT: Normocephalic, PERRLA, no thyromegaly, trache midline  Heart: RRR, normal S1 and S2, no murmur  Lungs: CTA b/l, no wheezing, no crackles  Abdomen: Soft, non-tender, non-distended, no guarding and BSx4  MSK: Moves all extremities well, normal gait, no peripheral edema  Pulses: Palpable and equal b/l  Lymph nodes: No palpable lymphadenopathy   Neuro: No focal deficits      Current Outpatient Medications   Medication Sig Dispense Refill   • amitriptyline (ELAVIL) 10 MG tablet TAKE 1 TO 2 TABLETS BY MOUTH EVERY NIGHT AT BEDTIME 180 tablet 0   • cetirizine (zyrTEC) 10 MG tablet TAKE 1 TABLET DAILY 90 tablet 0   • cholecalciferol (VITAMIN D3) 25 MCG (1000 UT) tablet Take 1 tablet by mouth Daily. 90 tablet 3   • clindamycin (CLEOCIN) 150 MG capsule Take 1 capsule by mouth 3 (Three) Times a Day. 30 capsule 0   • coenzyme Q10 100 MG capsule Take 1 capsule by mouth Daily. 90 capsule 3   • levothyroxine (SYNTHROID, LEVOTHROID) 50 MCG tablet Take 1 tablet by mouth Daily. 90 tablet 3   • lisinopril (PRINIVIL,ZESTRIL) 20 MG tablet TAKE 1 TABLET DAILY 90 tablet 3   • ondansetron (ZOFRAN) 4 MG tablet Take 1 tablet by mouth Every 8 (Eight) Hours As Needed for Nausea or Vomiting. 12 tablet 0   • Probiotic Product (PROBIOTIC DAILY) capsule Take 1 each by mouth Daily. 90 capsule 3   • predniSONE (DELTASONE) 20 MG tablet Take 2 tablets by mouth Daily for 5 days. 10 tablet 0     No current facility-administered medications for this visit.      Lungs clear on exam.  Rule out COVID-19.  She has several antibiotic allergies.  Treat with prednisone.  Call return to clinic if no  improvement.  Continue current dose Synthroid.    Diagnoses and all orders for this visit:    1. Recurrent sinusitis (Primary)  -     predniSONE (DELTASONE) 20 MG tablet; Take 2 tablets by mouth Daily for 5 days.  Dispense: 10 tablet; Refill: 0  -     methylPREDNISolone sodium succinate (SOLU-Medrol) injection 40 mg  -     COVID-19 PCR, [facility], NP SWAB IN [facility] VIRAL TRANSPORT MEDIA/ORAL SWISH 24-30 HR TAT - Swab, Nasopharynx; Future  -     COVID-19 PCR, [facility], NP SWAB IN [facility] VIRAL TRANSPORT MEDIA/ORAL SWISH 24-30 HR TAT - Swab, Nasopharynx    2. Nonintractable headache, unspecified chronicity pattern, unspecified headache type  -     COVID-19 PCR, [facility], NP SWAB IN [facility] VIRAL TRANSPORT MEDIA/ORAL SWISH 24-30 HR TAT - Swab, Nasopharynx; Future  -     COVID-19 PCR, [facility], NP SWAB IN [facility] VIRAL TRANSPORT MEDIA/ORAL SWISH 24-30 HR TAT - Swab, Nasopharynx    3. Hypothyroidism, unspecified type  -     levothyroxine (SYNTHROID, LEVOTHROID) 50 MCG tablet; Take 1 tablet by mouth Daily.  Dispense: 90 tablet; Refill: 3         No follow-ups on file.     Dictated Utilizing Dragon Dictation    Please note that portions of this note were completed with a voice recognition program.    Part of this note may be an electronic transcription/translation of spoken language to printed text using the Dragon Dictation System.

## 2021-11-13 LAB — SARS-COV-2 RNA NOSE QL NAA+PROBE: NOT DETECTED

## 2021-11-15 ENCOUNTER — TELEPHONE (OUTPATIENT)
Dept: FAMILY MEDICINE CLINIC | Facility: CLINIC | Age: 66
End: 2021-11-15

## 2021-11-17 DIAGNOSIS — J32.9 RECURRENT SINUSITIS: Primary | ICD-10-CM

## 2021-11-17 RX ORDER — METHYLPREDNISOLONE 4 MG/1
TABLET ORAL
Qty: 21 EACH | Refills: 0 | Status: SHIPPED | OUTPATIENT
Start: 2021-11-17 | End: 2022-05-17

## 2022-01-21 DIAGNOSIS — E03.9 HYPOTHYROIDISM, UNSPECIFIED TYPE: ICD-10-CM

## 2022-01-22 RX ORDER — LEVOTHYROXINE SODIUM 50 MCG
TABLET ORAL
Qty: 90 TABLET | Refills: 3 | OUTPATIENT
Start: 2022-01-22

## 2022-01-22 NOTE — TELEPHONE ENCOUNTER
Patient called back. Spoke with her and informed her of my message below. Patient verbalized understanding and had no further questions/concerns.

## 2022-01-22 NOTE — TELEPHONE ENCOUNTER
Rx Refill Note  Requested Prescriptions     Pending Prescriptions Disp Refills   • Synthroid 50 MCG tablet [Pharmacy Med Name: SYNTHROID TABS 50MCG] 90 tablet 3     Sig: TAKE 1 TABLET DAILY      Last office visit with prescribing clinician: 11/12/2021      Next office visit with prescribing clinician: Visit date not found            JEMMA LÓPEZ MA  01/22/22, 12:11 EST     Medication does not need to be refilled at this time. Medication was sent in 11/12/21 with a 90 day supply with 2 additional refills. Left patient voicemail.

## 2022-01-24 ENCOUNTER — TRANSCRIBE ORDERS (OUTPATIENT)
Dept: FAMILY MEDICINE CLINIC | Facility: CLINIC | Age: 67
End: 2022-01-24

## 2022-01-24 DIAGNOSIS — Z12.31 VISIT FOR SCREENING MAMMOGRAM: Primary | ICD-10-CM

## 2022-02-02 DIAGNOSIS — G47.00 INSOMNIA, UNSPECIFIED TYPE: ICD-10-CM

## 2022-02-02 RX ORDER — AMITRIPTYLINE HYDROCHLORIDE 10 MG/1
10-20 TABLET, FILM COATED ORAL
Qty: 180 TABLET | Refills: 0 | Status: SHIPPED | OUTPATIENT
Start: 2022-02-02 | End: 2022-05-17 | Stop reason: SDUPTHER

## 2022-03-02 ENCOUNTER — HOSPITAL ENCOUNTER (OUTPATIENT)
Dept: MAMMOGRAPHY | Facility: HOSPITAL | Age: 67
Discharge: HOME | End: 2022-03-02
Admitting: INTERNAL MEDICINE

## 2022-03-02 DIAGNOSIS — Z12.31 VISIT FOR SCREENING MAMMOGRAM: ICD-10-CM

## 2022-03-02 PROCEDURE — 77063 BREAST TOMOSYNTHESIS BI: CPT

## 2022-03-02 PROCEDURE — 77067 SCR MAMMO BI INCL CAD: CPT | Performed by: RADIOLOGY

## 2022-03-02 PROCEDURE — 77067 SCR MAMMO BI INCL CAD: CPT

## 2022-03-02 PROCEDURE — 77063 BREAST TOMOSYNTHESIS BI: CPT | Performed by: RADIOLOGY

## 2022-05-08 DIAGNOSIS — G47.00 INSOMNIA, UNSPECIFIED TYPE: ICD-10-CM

## 2022-05-09 NOTE — TELEPHONE ENCOUNTER
Rx Refill Note  Requested Prescriptions     Pending Prescriptions Disp Refills   • amitriptyline (ELAVIL) 10 MG tablet [Pharmacy Med Name: AMITRIPTYLINE 10MG TABLETS] 180 tablet 0     Sig: TAKE 1 TO 2 TABLETS BY MOUTH EVERY NIGHT AT BEDTIME      Last office visit with prescribing clinician: 11/12/2021      Next office visit with prescribing clinician: Visit date not found            Tricia Mary MA  05/09/22, 09:01 EDT     Pt will call back to schedule for annual exam overdue since 3/20/2022    [facility] CAN SCHEDULE

## 2022-05-10 RX ORDER — AMITRIPTYLINE HYDROCHLORIDE 10 MG/1
TABLET, FILM COATED ORAL
Qty: 180 TABLET | Refills: 0 | OUTPATIENT
Start: 2022-05-10

## 2022-05-10 NOTE — TELEPHONE ENCOUNTER
Rx Refill Note  Requested Prescriptions     Pending Prescriptions Disp Refills   • amitriptyline (ELAVIL) 10 MG tablet [Pharmacy Med Name: AMITRIPTYLINE 10MG TABLETS] 180 tablet 0     Sig: TAKE 1 TO 2 TABLETS BY MOUTH EVERY NIGHT AT BEDTIME      Last office visit with prescribing clinician: 11/12/2021      Next office visit with prescribing clinician: Visit date not found            Shannon Mckeon MA  05/10/22, 09:23 EDT     Called and left vm for patient to return call to schedule annual appointment    OK FOR [facility] TO RELAY MESSAGE AND SCHEDULE ANNUAL APPOINTMENT

## 2022-05-17 ENCOUNTER — OFFICE VISIT (OUTPATIENT)
Dept: FAMILY MEDICINE CLINIC | Facility: CLINIC | Age: 67
End: 2022-05-17

## 2022-05-17 ENCOUNTER — LAB (OUTPATIENT)
Dept: LAB | Facility: HOSPITAL | Age: 67
End: 2022-05-17

## 2022-05-17 VITALS
DIASTOLIC BLOOD PRESSURE: 76 MMHG | BODY MASS INDEX: 25.49 KG/M2 | HEIGHT: 61 IN | HEART RATE: 87 BPM | OXYGEN SATURATION: 98 % | SYSTOLIC BLOOD PRESSURE: 118 MMHG | WEIGHT: 135 LBS

## 2022-05-17 DIAGNOSIS — G47.00 INSOMNIA, UNSPECIFIED TYPE: ICD-10-CM

## 2022-05-17 DIAGNOSIS — M85.89 OSTEOPENIA OF MULTIPLE SITES: ICD-10-CM

## 2022-05-17 DIAGNOSIS — Z78.0 POSTMENOPAUSAL: ICD-10-CM

## 2022-05-17 DIAGNOSIS — Z00.00 PREVENTATIVE HEALTH CARE: Primary | ICD-10-CM

## 2022-05-17 LAB
25(OH)D3 SERPL-MCNC: 37.9 NG/ML (ref 30–100)
ALBUMIN SERPL-MCNC: 4.6 G/DL (ref 3.5–5.2)
ALBUMIN/GLOB SERPL: 2 G/DL
ALP SERPL-CCNC: 74 U/L (ref 39–117)
ALT SERPL W P-5'-P-CCNC: 14 U/L (ref 1–33)
ANION GAP SERPL CALCULATED.3IONS-SCNC: 10.1 MMOL/L (ref 5–15)
AST SERPL-CCNC: 20 U/L (ref 1–32)
BASOPHILS # BLD AUTO: 0.04 10*3/MM3 (ref 0–0.2)
BASOPHILS NFR BLD AUTO: 0.7 % (ref 0–1.5)
BILIRUB SERPL-MCNC: 0.4 MG/DL (ref 0–1.2)
BUN SERPL-MCNC: 13 MG/DL (ref 8–23)
BUN/CREAT SERPL: 16 (ref 7–25)
CALCIUM SPEC-SCNC: 9.2 MG/DL (ref 8.6–10.5)
CHLORIDE SERPL-SCNC: 102 MMOL/L (ref 98–107)
CHOLEST SERPL-MCNC: 225 MG/DL (ref 0–200)
CO2 SERPL-SCNC: 26.9 MMOL/L (ref 22–29)
CREAT SERPL-MCNC: 0.81 MG/DL (ref 0.57–1)
DEPRECATED RDW RBC AUTO: 43.4 FL (ref 37–54)
EGFRCR SERPLBLD CKD-EPI 2021: 81.2 ML/MIN/1.73
EOSINOPHIL # BLD AUTO: 0.03 10*3/MM3 (ref 0–0.4)
EOSINOPHIL NFR BLD AUTO: 0.5 % (ref 0.3–6.2)
ERYTHROCYTE [DISTWIDTH] IN BLOOD BY AUTOMATED COUNT: 13.5 % (ref 12.3–15.4)
GLOBULIN UR ELPH-MCNC: 2.3 GM/DL
GLUCOSE SERPL-MCNC: 86 MG/DL (ref 65–99)
HBA1C MFR BLD: 5.4 % (ref 4.8–5.6)
HCT VFR BLD AUTO: 43.7 % (ref 34–46.6)
HDLC SERPL-MCNC: 74 MG/DL (ref 40–60)
HGB BLD-MCNC: 14.2 G/DL (ref 12–15.9)
IMM GRANULOCYTES # BLD AUTO: 0.02 10*3/MM3 (ref 0–0.05)
IMM GRANULOCYTES NFR BLD AUTO: 0.3 % (ref 0–0.5)
LDLC SERPL CALC-MCNC: 140 MG/DL (ref 0–100)
LDLC/HDLC SERPL: 1.86 {RATIO}
LYMPHOCYTES # BLD AUTO: 1.46 10*3/MM3 (ref 0.7–3.1)
LYMPHOCYTES NFR BLD AUTO: 24.2 % (ref 19.6–45.3)
MCH RBC QN AUTO: 28.7 PG (ref 26.6–33)
MCHC RBC AUTO-ENTMCNC: 32.5 G/DL (ref 31.5–35.7)
MCV RBC AUTO: 88.5 FL (ref 79–97)
MONOCYTES # BLD AUTO: 0.4 10*3/MM3 (ref 0.1–0.9)
MONOCYTES NFR BLD AUTO: 6.6 % (ref 5–12)
NEUTROPHILS NFR BLD AUTO: 4.09 10*3/MM3 (ref 1.7–7)
NEUTROPHILS NFR BLD AUTO: 67.7 % (ref 42.7–76)
NRBC BLD AUTO-RTO: 0 /100 WBC (ref 0–0.2)
PLATELET # BLD AUTO: 269 10*3/MM3 (ref 140–450)
PMV BLD AUTO: 11.2 FL (ref 6–12)
POTASSIUM SERPL-SCNC: 4.2 MMOL/L (ref 3.5–5.2)
PROT SERPL-MCNC: 6.9 G/DL (ref 6–8.5)
RBC # BLD AUTO: 4.94 10*6/MM3 (ref 3.77–5.28)
SODIUM SERPL-SCNC: 139 MMOL/L (ref 136–145)
T4 FREE SERPL-MCNC: 1.44 NG/DL (ref 0.93–1.7)
TRIGL SERPL-MCNC: 66 MG/DL (ref 0–150)
TSH SERPL DL<=0.05 MIU/L-ACNC: 0.93 UIU/ML (ref 0.27–4.2)
VLDLC SERPL-MCNC: 11 MG/DL (ref 5–40)
WBC NRBC COR # BLD: 6.04 10*3/MM3 (ref 3.4–10.8)

## 2022-05-17 PROCEDURE — 84439 ASSAY OF FREE THYROXINE: CPT | Performed by: INTERNAL MEDICINE

## 2022-05-17 PROCEDURE — 99396 PREV VISIT EST AGE 40-64: CPT | Performed by: INTERNAL MEDICINE

## 2022-05-17 PROCEDURE — 82306 VITAMIN D 25 HYDROXY: CPT | Performed by: INTERNAL MEDICINE

## 2022-05-17 PROCEDURE — 80053 COMPREHEN METABOLIC PANEL: CPT | Performed by: INTERNAL MEDICINE

## 2022-05-17 PROCEDURE — 84443 ASSAY THYROID STIM HORMONE: CPT | Performed by: INTERNAL MEDICINE

## 2022-05-17 PROCEDURE — 83036 HEMOGLOBIN GLYCOSYLATED A1C: CPT | Performed by: INTERNAL MEDICINE

## 2022-05-17 PROCEDURE — 80061 LIPID PANEL: CPT | Performed by: INTERNAL MEDICINE

## 2022-05-17 PROCEDURE — 85025 COMPLETE CBC W/AUTO DIFF WBC: CPT | Performed by: INTERNAL MEDICINE

## 2022-05-17 RX ORDER — CETIRIZINE HYDROCHLORIDE 10 MG/1
10 TABLET ORAL DAILY
Qty: 90 TABLET | Refills: 3 | Status: SHIPPED | OUTPATIENT
Start: 2022-05-17

## 2022-05-17 RX ORDER — AMITRIPTYLINE HYDROCHLORIDE 10 MG/1
10-20 TABLET, FILM COATED ORAL
Qty: 180 TABLET | Refills: 3 | Status: SHIPPED | OUTPATIENT
Start: 2022-05-17

## 2022-05-17 NOTE — PROGRESS NOTES
Chief Complaint   Patient presents with   • Annual Exam       HPI:  Ester Hernandez is a 66 y.o. female who presents today for annual exam.  No acute concerns today.    ROS:  Constitutional: no fevers, night sweats or unexplained weight loss  Eyes: no vision changes  ENT: no runny nose, ear pain, sore throat  Cardio: no chest pain, palpitations  Pulm: no shortness of breath, wheezing, or cough  GI: no abdominal pain or changes in bowel movements  : no difficulty urinating  MSK: no difficulty ambulating, no joint pain  Neuro: no weakness, dizziness or headache  Psych: no trouble sleeping  Endo: no change in appetite      Past Medical History:   Diagnosis Date   • Abdominal pain    • Acute URI    • Breast cancer (HCC) 2004    right   • Breast cancer (HCC)    • Cyst of left upper eyelid    • Hx of radiation therapy    • Immunization deficiency    • Radiation 2004    right   • Seasonal allergies    • Sinusitis       Family History   Problem Relation Age of Onset   • Lung cancer Mother    • Hypertension Father    • Diabetes Father    • COPD Brother    • Breast cancer Neg Hx    • Ovarian cancer Neg Hx       Social History     Socioeconomic History   • Marital status:    Tobacco Use   • Smoking status: Never Smoker   • Smokeless tobacco: Never Used   Substance and Sexual Activity   • Alcohol use: No   • Drug use: No   • Sexual activity: Yes     Partners: Male     Birth control/protection: None     Comment:       Allergies   Allergen Reactions   • Venlafaxine Anxiety     And weakness.     • Amoxicillin    • Azithromycin    • Cephalexin    • Ciprofloxacin    • Clarithromycin    • Doxycycline    • Doxycycline Monohydrate    • Erythromycin    • Levofloxacin    • Loratadine       Immunization History   Administered Date(s) Administered   • COVID-19 (PFIZER) PURPLE CAP 03/23/2021, 04/24/2021   • Flu Vaccine Quad PF >36MO 01/09/2019, 11/09/2020, 09/11/2021   • FluLaval/Fluarix/Fluzone >6 01/09/2019, 11/09/2020   •  Fluzone Split Quad (Multi-dose) 11/03/2016, 01/15/2018   • Influenza Quad Vaccine (Inpatient) 01/15/2018   • Tdap 12/20/2010, 08/25/2020        PE:  Vitals:    05/17/22 0905   BP: 118/76   Pulse: 87   SpO2: 98%      Body mass index is 25.51 kg/m².    Gen Appearance: NAD  HEENT: Normocephalic, PERRLA, no thyromegaly, trache midline  Heart: RRR, normal S1 and S2, no murmur  Lungs: CTA b/l, no wheezing, no crackles  Abdomen: Soft, non-tender, non-distended, no guarding and BSx4  MSK: Moves all extremities well, normal gait, no peripheral edema  Pulses: Palpable and equal b/l  Lymph nodes: No palpable lymphadenopathy   Neuro: No focal deficits      Current Outpatient Medications   Medication Sig Dispense Refill   • amitriptyline (ELAVIL) 10 MG tablet Take 1-2 tablets by mouth every night at bedtime. 180 tablet 3   • cetirizine (zyrTEC) 10 MG tablet Take 1 tablet by mouth Daily. 90 tablet 3   • cholecalciferol (VITAMIN D3) 25 MCG (1000 UT) tablet Take 1 tablet by mouth Daily. 90 tablet 3   • clindamycin (CLEOCIN) 150 MG capsule Take 1 capsule by mouth 3 (Three) Times a Day. 30 capsule 0   • coenzyme Q10 100 MG capsule Take 1 capsule by mouth Daily. 90 capsule 3   • levothyroxine (SYNTHROID, LEVOTHROID) 50 MCG tablet Take 1 tablet by mouth Daily. 90 tablet 3   • lisinopril (PRINIVIL,ZESTRIL) 20 MG tablet TAKE 1 TABLET DAILY 90 tablet 3   • ondansetron (ZOFRAN) 4 MG tablet Take 1 tablet by mouth Every 8 (Eight) Hours As Needed for Nausea or Vomiting. 12 tablet 0   • Probiotic Product (PROBIOTIC DAILY) capsule Take 1 each by mouth Daily. 90 capsule 3     No current facility-administered medications for this visit.        Diagnoses and all orders for this visit:    1. Preventative health care (Primary)  -     Comprehensive Metabolic Panel; Future  -     CBC & Differential; Future  -     Hemoglobin A1c; Future  -     Lipid Panel; Future  -     TSH+Free T4; Future  -     Urinalysis With Culture If Indicated - Urine, Clean  Catch; Future  -     Vitamin D 25 Hydroxy; Future  -     Comprehensive Metabolic Panel  -     CBC & Differential  -     Hemoglobin A1c  -     Lipid Panel  -     TSH+Free T4  -     Vitamin D 25 Hydroxy  Counseled on healthy weight, nutrition, physical activity, cancer screening, and immunizations.    2. Insomnia, unspecified type  -     amitriptyline (ELAVIL) 10 MG tablet; Take 1-2 tablets by mouth every night at bedtime.  Dispense: 180 tablet; Refill: 3    3. Osteopenia of multiple sites    4. Postmenopausal  -     DEXA Bone Density Axial; Future    Other orders  -     cetirizine (zyrTEC) 10 MG tablet; Take 1 tablet by mouth Daily.  Dispense: 90 tablet; Refill: 3         Return in about 1 year (around 5/17/2023) for Annual.     Dictated Utilizing Dragon Dictation    Please note that portions of this note were completed with a voice recognition program.    Part of this note may be an electronic transcription/translation of spoken language to printed text using the Dragon Dictation System.

## 2022-05-19 ENCOUNTER — TELEPHONE (OUTPATIENT)
Dept: FAMILY MEDICINE CLINIC | Facility: CLINIC | Age: 67
End: 2022-05-19

## 2022-05-20 DIAGNOSIS — E03.9 HYPOTHYROIDISM, UNSPECIFIED TYPE: Primary | ICD-10-CM

## 2022-05-23 ENCOUNTER — HOSPITAL ENCOUNTER (OUTPATIENT)
Dept: BONE DENSITY | Facility: HOSPITAL | Age: 67
Discharge: HOME | End: 2022-05-23
Admitting: INTERNAL MEDICINE

## 2022-05-23 DIAGNOSIS — Z78.0 POSTMENOPAUSAL: ICD-10-CM

## 2022-05-23 PROCEDURE — 77080 DXA BONE DENSITY AXIAL: CPT

## 2022-10-05 DIAGNOSIS — E03.9 HYPOTHYROIDISM, UNSPECIFIED TYPE: ICD-10-CM

## 2022-10-05 RX ORDER — LISINOPRIL 20 MG/1
TABLET ORAL
Qty: 90 TABLET | Refills: 3 | Status: SHIPPED | OUTPATIENT
Start: 2022-10-05

## 2022-10-14 ENCOUNTER — OFFICE VISIT (OUTPATIENT)
Dept: INTERNAL MEDICINE | Facility: CLINIC | Age: 67
End: 2022-10-14
Payer: OTHER GOVERNMENT

## 2022-10-14 ENCOUNTER — OFFICE VISIT (OUTPATIENT)
Dept: FAMILY MEDICINE CLINIC | Facility: CLINIC | Age: 67
End: 2022-10-14

## 2022-10-14 VITALS — BODY MASS INDEX: 25.51 KG/M2 | HEIGHT: 61 IN

## 2022-10-14 VITALS
OXYGEN SATURATION: 98 % | BODY MASS INDEX: 24.81 KG/M2 | HEIGHT: 61 IN | SYSTOLIC BLOOD PRESSURE: 126 MMHG | WEIGHT: 131.4 LBS | HEART RATE: 93 BPM | DIASTOLIC BLOOD PRESSURE: 82 MMHG

## 2022-10-14 DIAGNOSIS — H01.113 CONTACT DERMATITIS OF EYELIDS OF BOTH EYES: Primary | ICD-10-CM

## 2022-10-14 DIAGNOSIS — H01.116 CONTACT DERMATITIS OF EYELIDS OF BOTH EYES: Primary | ICD-10-CM

## 2022-10-14 PROCEDURE — 99213 OFFICE O/P EST LOW 20 MIN: CPT | Performed by: STUDENT IN AN ORGANIZED HEALTH CARE EDUCATION/TRAINING PROGRAM

## 2022-10-14 RX ORDER — DIAPER,BRIEF,INFANT-TODD,DISP
1 EACH MISCELLANEOUS 2 TIMES DAILY
Qty: 30 G | Refills: 0 | Status: SHIPPED | OUTPATIENT
Start: 2022-10-14

## 2022-10-14 NOTE — PROGRESS NOTES
Established Office Visit      Patient Name: Ester Hernandez  : 1955   MRN: [id number]   Care Team: Patient Care Team:  Rios Chapman DO as PCP - General (Internal Medicine)    Chief Complaint:    Chief Complaint   Patient presents with   • Eye Problem     Itchy, redness around both eyes, has been going on for weeks, red on eyelids, and scaly        History of Present Illness: Ester Hernandez is a 66 y.o. female who is here today to discuss eyelid irritation.    Symptoms ongoing for about 3 weeks. Reports itchy red eyelids with scaling which comes and does. She has been using warm compress as needed and pataday eye drops which have been somewhat helpful. Denies discharge from eyes, vision changes, light sensitivity, ocular pain, joint pain, myalgias.     She has been experimenting with different make up lately including eye shadows, mascaras, foundation.     Subjective      Review of Systems:   Review of Systems - See HPI    I have reviewed and the following portions of the patient's history were updated as appropriate: past family history, past medical history, past social history, past surgical history and problem list.    Medications:     Current Outpatient Medications:   •  amitriptyline (ELAVIL) 10 MG tablet, Take 1-2 tablets by mouth every night at bedtime., Disp: 180 tablet, Rfl: 3  •  cetirizine (zyrTEC) 10 MG tablet, Take 1 tablet by mouth Daily., Disp: 90 tablet, Rfl: 3  •  cholecalciferol (VITAMIN D3) 25 MCG (1000 UT) tablet, Take 1 tablet by mouth Daily., Disp: 90 tablet, Rfl: 3  •  coenzyme Q10 100 MG capsule, Take 1 capsule by mouth Daily., Disp: 90 capsule, Rfl: 3  •  lisinopril (PRINIVIL,ZESTRIL) 20 MG tablet, TAKE 1 TABLET DAILY, Disp: 90 tablet, Rfl: 3  •  Probiotic Product (PROBIOTIC DAILY) capsule, Take 1 each by mouth Daily., Disp: 90 capsule, Rfl: 3  •  hydrocortisone 1 % cream, Apply 1 application topically to the appropriate area as directed 2 (Two) Times a Day., Disp: 30 g,  "Rfl: 0  •  levothyroxine (SYNTHROID, LEVOTHROID) 50 MCG tablet, Take 1 tablet by mouth Daily., Disp: 90 tablet, Rfl: 3    Allergies:   Allergies   Allergen Reactions   • Venlafaxine Anxiety     And weakness.     • Amoxicillin    • Azithromycin    • Cephalexin    • Ciprofloxacin    • Clarithromycin    • Doxycycline    • Doxycycline Monohydrate    • Erythromycin    • Levofloxacin    • Loratadine        Objective     Physical Exam:  Vital Signs:   Vitals:    10/14/22 1120   BP: 126/82   Pulse: 93   SpO2: 98%   Weight: 59.6 kg (131 lb 6.4 oz)   Height: 154.9 cm (61\")     Body mass index is 24.83 kg/m².     Physical Exam  Constitutional:       Appearance: She is not ill-appearing.   Eyes:      General:         Right eye: No discharge.         Left eye: No discharge.      Extraocular Movements: Extraocular movements intact.      Conjunctiva/sclera: Conjunctivae normal.   Skin:     Comments: Mildly erythematous eyelids bilaterally, dry, flaky.         Assessment / Plan      Assessment/Plan:   Problems Addressed This Visit  Diagnoses and all orders for this visit:    1. Contact dermatitis of eyelids of both eyes (Primary)  -     hydrocortisone 1 % cream; Apply 1 application topically to the appropriate area as directed 2 (Two) Times a Day.  Dispense: 30 g; Refill: 0      Suspect contact dermatitis after experimenting with multiple different makeup products over the past few weeks. Recommended removal of all possible irritants/allergens such as new makeup, face wash, soaps, serums. Start OTC hydrocortisone  1% cream 1-2x daily for 1 week. Discussed importance of avoiding medium/high potency steroid on thin skin such as eye lids. RTC if symptoms worsen or do not improve.     Plan of care reviewed with patient at the conclusion of today's visit. Education was provided regarding diagnosis and management.  Patient verbalizes understanding of and agreement with management plan.    Follow Up:   No follow-ups on file.        Emerald" DO CATRACHO Marcelo RD  [facility]  [address]  [facility] [id number]  Fax [phone]  Phone [phone]

## 2023-01-04 ENCOUNTER — TELEPHONE (OUTPATIENT)
Dept: FAMILY MEDICINE CLINIC | Facility: CLINIC | Age: 68
End: 2023-01-04
Payer: OTHER GOVERNMENT

## 2023-01-04 NOTE — TELEPHONE ENCOUNTER
Patient left message regarding a bill she received. Called patient back and let her know she was charged for being seen on 10/14 with Dr. Andres. She states when she left the visit, checkout told her her didn't owe anything. I told her we would only collect the copay and if she didn't have a copay, then we would bill her insurance. I told her I can't see the billing information, but assume she has a bill due to having a deductible. I let her know the charge was correct, as she would have an office visit billed since she was seen. She will check her bill and verify with insurance when she gets home.

## 2023-02-03 ENCOUNTER — TRANSCRIBE ORDERS (OUTPATIENT)
Dept: ADMINISTRATIVE | Facility: HOSPITAL | Age: 68
End: 2023-02-03
Payer: OTHER GOVERNMENT

## 2023-02-03 DIAGNOSIS — Z12.31 VISIT FOR SCREENING MAMMOGRAM: Primary | ICD-10-CM

## 2023-03-06 ENCOUNTER — HOSPITAL ENCOUNTER (OUTPATIENT)
Dept: MAMMOGRAPHY | Facility: HOSPITAL | Age: 68
Discharge: HOME | End: 2023-03-06
Admitting: INTERNAL MEDICINE
Payer: MEDICARE

## 2023-03-06 DIAGNOSIS — Z12.31 VISIT FOR SCREENING MAMMOGRAM: ICD-10-CM

## 2023-03-06 PROCEDURE — 77063 BREAST TOMOSYNTHESIS BI: CPT | Performed by: RADIOLOGY

## 2023-03-06 PROCEDURE — 77067 SCR MAMMO BI INCL CAD: CPT | Performed by: RADIOLOGY

## 2023-03-06 PROCEDURE — 77067 SCR MAMMO BI INCL CAD: CPT

## 2023-03-06 PROCEDURE — 77063 BREAST TOMOSYNTHESIS BI: CPT

## 2023-04-24 ENCOUNTER — TELEPHONE (OUTPATIENT)
Dept: FAMILY MEDICINE CLINIC | Facility: CLINIC | Age: 68
End: 2023-04-24
Payer: OTHER GOVERNMENT

## 2023-04-24 NOTE — TELEPHONE ENCOUNTER
Caller: Ester Hernandez    Relationship: Self    Best call back number: [phone]    What orders are you requesting (i.e. lab or imaging): FASTING LABS    In what timeframe would the patient need to come in: BEFORE 4/28/23 APPOINTMENT    Where will you receive your lab/imaging services: IN OFFICE    Additional notes: PLEASE ADVISE PATIENT WHEN THESE LABS HAVE BEEN ORDERED IN PREPARATION FOR HER WELCOME TO MEDICARE VISIT ON 4/28/23

## 2023-04-26 ENCOUNTER — TELEPHONE (OUTPATIENT)
Dept: FAMILY MEDICINE CLINIC | Facility: CLINIC | Age: 68
End: 2023-04-26
Payer: OTHER GOVERNMENT

## 2023-04-26 DIAGNOSIS — E55.9 VITAMIN D DEFICIENCY: ICD-10-CM

## 2023-04-26 DIAGNOSIS — I10 PRIMARY HYPERTENSION: ICD-10-CM

## 2023-04-26 DIAGNOSIS — Z78.0 POSTMENOPAUSAL: ICD-10-CM

## 2023-04-26 DIAGNOSIS — E03.9 HYPOTHYROIDISM, UNSPECIFIED TYPE: Primary | ICD-10-CM

## 2023-04-26 NOTE — TELEPHONE ENCOUNTER
Patient called on Monday requesting that her fasting labs be ordered by  because she is coming in on Friday, 04/28/23 for her Welcome to Medicare appointment. She has gotten no response yet and would like to get these done before Friday. Please call patient and let her know once the Labs are ordered so that she will know to come in and have her blood drawn.

## 2023-04-27 ENCOUNTER — LAB (OUTPATIENT)
Dept: LAB | Facility: HOSPITAL | Age: 68
End: 2023-04-27
Payer: MEDICARE

## 2023-04-27 DIAGNOSIS — E03.9 HYPOTHYROIDISM, UNSPECIFIED TYPE: ICD-10-CM

## 2023-04-27 DIAGNOSIS — I10 PRIMARY HYPERTENSION: ICD-10-CM

## 2023-04-27 DIAGNOSIS — Z78.0 POSTMENOPAUSAL: ICD-10-CM

## 2023-04-27 DIAGNOSIS — E55.9 VITAMIN D DEFICIENCY: ICD-10-CM

## 2023-04-27 LAB
25(OH)D3 SERPL-MCNC: 35.8 NG/ML (ref 30–100)
ALBUMIN SERPL-MCNC: 4.1 G/DL (ref 3.5–5.2)
ALBUMIN/GLOB SERPL: 1.7 G/DL
ALP SERPL-CCNC: 65 U/L (ref 39–117)
ALT SERPL W P-5'-P-CCNC: 18 U/L (ref 1–33)
ANION GAP SERPL CALCULATED.3IONS-SCNC: 9.6 MMOL/L (ref 5–15)
AST SERPL-CCNC: 19 U/L (ref 1–32)
BASOPHILS # BLD AUTO: 0.03 10*3/MM3 (ref 0–0.2)
BASOPHILS NFR BLD AUTO: 0.5 % (ref 0–1.5)
BILIRUB SERPL-MCNC: 0.5 MG/DL (ref 0–1.2)
BILIRUB UR QL STRIP: NEGATIVE
BUN SERPL-MCNC: 10 MG/DL (ref 8–23)
BUN/CREAT SERPL: 12.2 (ref 7–25)
CALCIUM SPEC-SCNC: 9 MG/DL (ref 8.6–10.5)
CHLORIDE SERPL-SCNC: 97 MMOL/L (ref 98–107)
CHOLEST SERPL-MCNC: 240 MG/DL (ref 0–200)
CLARITY UR: CLEAR
CO2 SERPL-SCNC: 27.4 MMOL/L (ref 22–29)
COLOR UR: YELLOW
CREAT SERPL-MCNC: 0.82 MG/DL (ref 0.57–1)
DEPRECATED RDW RBC AUTO: 43.3 FL (ref 37–54)
EGFRCR SERPLBLD CKD-EPI 2021: 79.5 ML/MIN/1.73
EOSINOPHIL # BLD AUTO: 0.05 10*3/MM3 (ref 0–0.4)
EOSINOPHIL NFR BLD AUTO: 0.8 % (ref 0.3–6.2)
ERYTHROCYTE [DISTWIDTH] IN BLOOD BY AUTOMATED COUNT: 13.1 % (ref 12.3–15.4)
GLOBULIN UR ELPH-MCNC: 2.4 GM/DL
GLUCOSE SERPL-MCNC: 78 MG/DL (ref 65–99)
GLUCOSE UR STRIP-MCNC: NEGATIVE MG/DL
HBA1C MFR BLD: 5.4 % (ref 4.8–5.6)
HCT VFR BLD AUTO: 42.3 % (ref 34–46.6)
HDLC SERPL-MCNC: 71 MG/DL (ref 40–60)
HGB BLD-MCNC: 14 G/DL (ref 12–15.9)
HGB UR QL STRIP.AUTO: NEGATIVE
IMM GRANULOCYTES # BLD AUTO: 0.02 10*3/MM3 (ref 0–0.05)
IMM GRANULOCYTES NFR BLD AUTO: 0.3 % (ref 0–0.5)
KETONES UR QL STRIP: NEGATIVE
LDLC SERPL CALC-MCNC: 154 MG/DL (ref 0–100)
LDLC/HDLC SERPL: 2.13 {RATIO}
LEUKOCYTE ESTERASE UR QL STRIP.AUTO: NEGATIVE
LYMPHOCYTES # BLD AUTO: 1.92 10*3/MM3 (ref 0.7–3.1)
LYMPHOCYTES NFR BLD AUTO: 30.2 % (ref 19.6–45.3)
MCH RBC QN AUTO: 29.7 PG (ref 26.6–33)
MCHC RBC AUTO-ENTMCNC: 33.1 G/DL (ref 31.5–35.7)
MCV RBC AUTO: 89.8 FL (ref 79–97)
MONOCYTES # BLD AUTO: 0.45 10*3/MM3 (ref 0.1–0.9)
MONOCYTES NFR BLD AUTO: 7.1 % (ref 5–12)
NEUTROPHILS NFR BLD AUTO: 3.88 10*3/MM3 (ref 1.7–7)
NEUTROPHILS NFR BLD AUTO: 61.1 % (ref 42.7–76)
NITRITE UR QL STRIP: NEGATIVE
NRBC BLD AUTO-RTO: 0 /100 WBC (ref 0–0.2)
PH UR STRIP.AUTO: 7.5 [PH] (ref 5–8)
PLATELET # BLD AUTO: 242 10*3/MM3 (ref 140–450)
PMV BLD AUTO: 11.3 FL (ref 6–12)
POTASSIUM SERPL-SCNC: 4.1 MMOL/L (ref 3.5–5.2)
PROT SERPL-MCNC: 6.5 G/DL (ref 6–8.5)
PROT UR QL STRIP: NEGATIVE
RBC # BLD AUTO: 4.71 10*6/MM3 (ref 3.77–5.28)
SODIUM SERPL-SCNC: 134 MMOL/L (ref 136–145)
SP GR UR STRIP: <=1.005 (ref 1–1.03)
T4 FREE SERPL-MCNC: 1.11 NG/DL (ref 0.93–1.7)
TRIGL SERPL-MCNC: 88 MG/DL (ref 0–150)
TSH SERPL DL<=0.05 MIU/L-ACNC: 5.05 UIU/ML (ref 0.27–4.2)
UROBILINOGEN UR QL STRIP: NORMAL
VLDLC SERPL-MCNC: 15 MG/DL (ref 5–40)
WBC NRBC COR # BLD: 6.35 10*3/MM3 (ref 3.4–10.8)

## 2023-04-27 PROCEDURE — 80061 LIPID PANEL: CPT

## 2023-04-27 PROCEDURE — 85025 COMPLETE CBC W/AUTO DIFF WBC: CPT

## 2023-04-27 PROCEDURE — 81003 URINALYSIS AUTO W/O SCOPE: CPT

## 2023-04-27 PROCEDURE — 83036 HEMOGLOBIN GLYCOSYLATED A1C: CPT

## 2023-04-27 PROCEDURE — 84443 ASSAY THYROID STIM HORMONE: CPT

## 2023-04-27 PROCEDURE — 82306 VITAMIN D 25 HYDROXY: CPT

## 2023-04-27 PROCEDURE — 84439 ASSAY OF FREE THYROXINE: CPT

## 2023-04-27 PROCEDURE — 80053 COMPREHEN METABOLIC PANEL: CPT

## 2023-04-28 ENCOUNTER — OFFICE VISIT (OUTPATIENT)
Dept: FAMILY MEDICINE CLINIC | Facility: CLINIC | Age: 68
End: 2023-04-28
Payer: MEDICARE

## 2023-04-28 VITALS
OXYGEN SATURATION: 98 % | DIASTOLIC BLOOD PRESSURE: 78 MMHG | HEART RATE: 90 BPM | HEIGHT: 61 IN | SYSTOLIC BLOOD PRESSURE: 124 MMHG | WEIGHT: 136 LBS | BODY MASS INDEX: 25.68 KG/M2

## 2023-04-28 DIAGNOSIS — E55.9 VITAMIN D DEFICIENCY: ICD-10-CM

## 2023-04-28 DIAGNOSIS — I10 PRIMARY HYPERTENSION: ICD-10-CM

## 2023-04-28 DIAGNOSIS — Z00.00 WELCOME TO MEDICARE PREVENTIVE VISIT: Primary | ICD-10-CM

## 2023-04-28 DIAGNOSIS — E03.9 HYPOTHYROIDISM, UNSPECIFIED TYPE: ICD-10-CM

## 2023-04-28 DIAGNOSIS — Z78.0 POSTMENOPAUSAL: ICD-10-CM

## 2023-04-28 NOTE — PROGRESS NOTES
QUICK REFERENCE INFORMATION:  The ABCs of the Annual Wellness Visit  Ester Hernandez is a 67 y.o. female presenting for Welcome to Medicare  visit and  Welcome To Medicare and Hypertension (Follow up)  .     Welcome to Medicare Visit    Chief Complaint   Patient presents with   • Welcome To Medicare   • Hypertension     Follow up        HPI   Patient here for Medicare wellness visit.  No acute concerns today.  @ROS:  Constitutional: no fevers, night sweats or unexplained weight loss  Eyes: no vision changes  ENT: no runny nose, ear pain, sore throat  Cardio: no chest pain, palpitations  Pulm: no shortness of breath, wheezing, or cough  GI: no abdominal pain or changes in bowel movements  : no difficulty urinating  MSK: no difficulty ambulating, no joint pain  Neuro: no weakness, dizziness or headache  Psych: no trouble sleeping  Endo: no change in appetite     Past Medical History:   Diagnosis Date   • Abdominal pain    • Acute URI    • Breast cancer 2004    right   • Breast cancer    • Cyst of left upper eyelid    • Hx of radiation therapy    • Immunization deficiency    • Radiation 2004    right   • Seasonal allergies    • Sinusitis         Past Surgical History:   Procedure Laterality Date   • BASAL CELL CARCINOMA EXCISION     • BREAST BIOPSY Right 2004    stereo bx   • BREAST LUMPECTOMY Right 2004   • BREAST SURGERY     • HYSTERECTOMY  2004   • OOPHORECTOMY         Family History   Problem Relation Age of Onset   • Lung cancer Mother    • Hypertension Father    • Diabetes Father    • COPD Brother    • Breast cancer Neg Hx    • Ovarian cancer Neg Hx         Social History     Socioeconomic History   • Marital status:    Tobacco Use   • Smoking status: Never   • Smokeless tobacco: Never   Vaping Use   • Vaping Use: Never used   Substance and Sexual Activity   • Alcohol use: No   • Drug use: No   • Sexual activity: Yes     Partners: Male     Birth control/protection: None     Comment:         Current  "Outpatient Medications   Medication Sig Dispense Refill   • amitriptyline (ELAVIL) 10 MG tablet Take 1-2 tablets by mouth every night at bedtime. 180 tablet 3   • cetirizine (zyrTEC) 10 MG tablet Take 1 tablet by mouth Daily. 90 tablet 3   • cholecalciferol (VITAMIN D3) 25 MCG (1000 UT) tablet Take 1 tablet by mouth Daily. 90 tablet 3   • coenzyme Q10 100 MG capsule Take 1 capsule by mouth Daily. 90 capsule 3   • hydrocortisone 1 % cream Apply 1 application topically to the appropriate area as directed 2 (Two) Times a Day. 30 g 0   • lisinopril (PRINIVIL,ZESTRIL) 20 MG tablet TAKE 1 TABLET DAILY 90 tablet 3   • Probiotic Product (PROBIOTIC DAILY) capsule Take 1 each by mouth Daily. 90 capsule 3     No current facility-administered medications for this visit.        Allergies   Allergen Reactions   • Venlafaxine Anxiety     And weakness.     • Amoxicillin    • Azithromycin    • Cephalexin    • Ciprofloxacin    • Clarithromycin    • Doxycycline    • Doxycycline Monohydrate    • Erythromycin    • Levofloxacin    • Loratadine         Immunization History   Administered Date(s) Administered   • COVID-19 (PFIZER) Purple Cap Monovalent 03/23/2021, 04/24/2021   • Flu Vaccine Quad PF >36MO 01/09/2019, 11/09/2020, 09/11/2021   • FluLaval/Fluzone >6mos 01/09/2019, 11/09/2020, 09/11/2021   • Fluzone Quad >6mos (Multi-dose) 11/03/2016, 01/15/2018   • Influenza Injectable Mdck Pf Quad 09/28/2022   • Influenza Quad Vaccine (Inpatient) 09/30/2010, 01/15/2018   • Influenza, Unspecified 09/28/2022   • Pneumococcal Conjugate 20-Valent (PCV20) 04/28/2023   • Tdap 12/20/2010, 08/25/2020        The following portions of the patient's history were reviewed and updated as appropriate: allergies, current medications, past family history, past medical history, past social history, past surgical history and problem list.      Objective    Visit Vitals  /78   Pulse 90   Ht 154.9 cm (61\")   Wt 61.7 kg (136 lb)   LMP  (LMP Unknown)   SpO2 " 98%   BMI 25.70 kg/m²        Physical Exam  Gen Appearance: NAD  HEENT: Normocephalic, PERRLA, no thyromegaly, trache midline  Heart: RRR, normal S1 and S2, no murmur  Lungs: CTA b/l, no wheezing, no crackles  Abdomen: Soft, non-tender, non-distended, no guarding and BSx4  MSK: Moves all extremities well, normal gait, no peripheral edema  Pulses: Palpable and equal b/l  Lymph nodes: No palpable lymphadenopathy   Neuro: No focal deficits     HEALTH RISK ASSESSMENT    1955    Recent Hospitalizations:  No hospitalization(s) within the last year..      Current Medical Providers:  Patient Care Team:  Rios Chapman DO as PCP - General (Internal Medicine)      Smoking Status:  Social History     Tobacco Use   Smoking Status Never   Smokeless Tobacco Never       Alcohol Consumption:  Social History     Substance and Sexual Activity   Alcohol Use No       Depression Screen:       2023     2:02 PM   PHQ-2/PHQ-9 Depression Screening   Little Interest or Pleasure in Doing Things 0-->not at all   Feeling Down, Depressed or Hopeless 0-->not at all   PHQ-9: Brief Depression Severity Measure Score 0       Health Habits and Functional and Cognitive Screenin/28/2023     2:02 PM   Functional & Cognitive Status   Do you have difficulty preparing food and eating? No   Do you have difficulty bathing yourself, getting dressed or grooming yourself? No   Do you have difficulty using the toilet? No   Do you have difficulty moving around from place to place? No   Do you have trouble with steps or getting out of a bed or a chair? No   Current Diet Well Balanced Diet   Dental Exam Up to date   Eye Exam Up to date   Exercise (times per week) 4 times per week   Current Exercises Include Walking   Do you need help using the phone?  No   Are you deaf or do you have serious difficulty hearing?  No   Do you need help with transportation? No   Do you need help shopping? No   Do you need help preparing meals?  No   Do you  need help with housework?  No   Do you need help with laundry? No   Do you need help taking your medications? No   Do you need help managing money? No   Do you ever drive or ride in a car without wearing a seat belt? No   Have you felt unusual stress, anger or loneliness in the last month? No   Who do you live with? Alone   If you need help, do you have trouble finding someone available to you? No   Have you been bothered in the last four weeks by sexual problems? No   Do you have difficulty concentrating, remembering or making decisions? No         Does the patient have evidence of cognitive impairment? No    Aspirin use counseling? Does not need ASA (and currently is not on it)      Recent Lab Results:  CMP:  Lab Results   Component Value Date    BUN 10 04/27/2023    CREATININE 0.82 04/27/2023    EGFRIFNONA 89 07/09/2021    BCR 12.2 04/27/2023     (L) 04/27/2023    K 4.1 04/27/2023    CO2 27.4 04/27/2023    CALCIUM 9.0 04/27/2023    ALBUMIN 4.1 04/27/2023    BILITOT 0.5 04/27/2023    ALKPHOS 65 04/27/2023    AST 19 04/27/2023    ALT 18 04/27/2023     Lipid Panel:  Lab Results   Component Value Date    CHOL 240 (H) 04/27/2023    TRIG 88 04/27/2023    HDL 71 (H) 04/27/2023    VLDL 15 04/27/2023    LDLHDL 2.13 04/27/2023     HbA1c:  Lab Results   Component Value Date    HGBA1C 5.40 04/27/2023       Visual Acuity:  Vision Screening    Right eye Left eye Both eyes   Without correction 20/25 20/30 20/25   With correction          Age-appropriate Screening Schedule:  Refer to the list below for future screening recommendations based on patient's age, sex and/or medical conditions. Orders for these recommended tests are listed in the plan section. The patient has been provided with a written plan.    Health Maintenance   Topic Date Due   • ZOSTER VACCINE (1 of 2) Never done   • PAP SMEAR  07/10/2020   • COVID-19 Vaccine (3 - Booster for Pfizer series) 06/19/2021   • INFLUENZA VACCINE  08/01/2023   • LIPID PANEL   04/27/2024   • ANNUAL WELLNESS VISIT  04/28/2024   • DXA SCAN  05/23/2024   • MAMMOGRAM  03/06/2025   • COLORECTAL CANCER SCREENING  01/21/2029   • TDAP/TD VACCINES (3 - Td or Tdap) 08/25/2030   • HEPATITIS C SCREENING  Completed   • Pneumococcal Vaccine 65+  Completed          Advance Care Planning:  ACP discussion was declined by the patient. Patient does not have an advance directive, declines further assistance.    Identification of Risk Factors:  Risk factors include: Advance Directive Discussion  Breast Cancer/Mammogram Screening  Cardiovascular risk  Colon Cancer Screening.    Compared to one year ago, the patient feels her physical health is the same.  Compared to one year ago, the patient feels her mental health is the same.      Diagnoses and all orders for this visit:    1. Welcome to Medicare preventive visit (Primary)  Counseled on healthy weight, nutrition, physical activity, cancer screening, and immunizations.    2. Hypothyroidism, unspecified type    3. Primary hypertension    4. Postmenopausal    5. Vitamin D deficiency    Other orders  -     Pneumococcal Conjugate Vaccine 20-Valent (PCV20)            Patient Self-Management and Personalized Health Advice  The patient has been provided with information about: diet, exercise, prevention of cardiac or vascular disease and designing advance directives and preventive services including:   · Annual Wellness Visit (AWV)  · Cardiovascular Disease Screening Tests (may do this order every 5 years in beneficiaries without signs or symptoms of cardiovascular disease).      Follow Up:  Return in about 1 year (around 4/28/2024) for Medicare Wellness.     An After Visit Summary and PPPS with all of these plans were given to the patient.           Dictated Utilizing Dragon Dictation    Please note that portions of this note were completed with a voice recognition program.    Part of this note may be an electronic transcription/translation of spoken language to  printed text using the Dragon Dictation System.

## 2023-04-28 NOTE — LETTER
"VACCINE CONSENT FORM      Patient Name:  Ester Hernandez    Patient :  1955      I/We have read, or have been explained, the information about the diseases and the vaccines listed below.  There was an opportunity to ask questions and any questions were answered satisfactorily.  I/We believe that I/we understand the benefits and risks of the vaccines(s) cited, and ask the vaccine(s) listed below be given to me/us or the person named above (for which i have authorized to make the request).      Vaccine(s) give:    Orders Placed This Encounter   Procedures   • Pneumococcal Conjugate Vaccine 20-Valent (PCV20)         Medicare patients:    The only vaccine covered under your medical benefit is flu/pneumonia and hepatitis B.  All other may be covered under your \"Part D\" prescription plan and requires you to go to a pharmacy with a Physician orders for administration.  If you still prefer to have it administered at our office, you will receive a bill for the vaccine and administration cost.               Patient Initials                     Patient or Parent/Guardian Signature                    Date        A copy of the appropriate Centers for Disease Control and Prevention Vaccine Information Statements has been provided.   "

## 2023-05-12 ENCOUNTER — TELEPHONE (OUTPATIENT)
Dept: FAMILY MEDICINE CLINIC | Facility: CLINIC | Age: 68
End: 2023-05-12
Payer: OTHER GOVERNMENT

## 2023-05-12 NOTE — TELEPHONE ENCOUNTER
Contacted pt & notified her that Cipro is on her list but not Macrobid. Pt states she took Bactrim from her OB's office and got a rash. Pt would like that added to her allergies. List has been updated & she had no further questions.

## 2023-05-12 NOTE — TELEPHONE ENCOUNTER
Caller: Ester Hernandez    Relationship to patient: Self    Best call back number: [phone]    Patient is needing: PATIENT NEEDS TO KNOW IF SHE IS ALLERGIC TO CIPRO, MACROBID.

## 2023-08-01 DIAGNOSIS — G47.00 INSOMNIA, UNSPECIFIED TYPE: ICD-10-CM

## 2023-08-01 RX ORDER — CETIRIZINE HYDROCHLORIDE 10 MG/1
TABLET ORAL
Qty: 90 TABLET | Refills: 0 | Status: SHIPPED | OUTPATIENT
Start: 2023-08-01

## 2023-08-01 RX ORDER — AMITRIPTYLINE HYDROCHLORIDE 10 MG/1
TABLET, FILM COATED ORAL
Qty: 180 TABLET | Refills: 0 | Status: SHIPPED | OUTPATIENT
Start: 2023-08-01

## 2023-10-30 DIAGNOSIS — G47.00 INSOMNIA, UNSPECIFIED TYPE: ICD-10-CM

## 2023-10-30 RX ORDER — AMITRIPTYLINE HYDROCHLORIDE 10 MG/1
TABLET, FILM COATED ORAL
Qty: 180 TABLET | Refills: 0 | Status: SHIPPED | OUTPATIENT
Start: 2023-10-30

## 2023-10-30 RX ORDER — CETIRIZINE HYDROCHLORIDE 10 MG/1
TABLET ORAL
Qty: 90 TABLET | Refills: 0 | Status: SHIPPED | OUTPATIENT
Start: 2023-10-30

## 2023-10-30 NOTE — TELEPHONE ENCOUNTER
Rx Refill Note  Requested Prescriptions     Pending Prescriptions Disp Refills    amitriptyline (ELAVIL) 10 MG tablet [Pharmacy Med Name: AMITRIPTYLINE 10MG TABLETS] 180 tablet 0     Sig: TAKE 1 TO 2 TABLETS BY MOUTH EVERY NIGHT AT BEDTIME    cetirizine (zyrTEC) 10 MG tablet [Pharmacy Med Name: CETIRIZINE 10MG TABLETS] 90 tablet 0     Sig: TAKE 1 TABLET BY MOUTH EVERY DAY      Last office visit with prescribing clinician: 4/28/2023   Last telemedicine visit with prescribing clinician: Visit date not found   Next office visit with prescribing clinician: 5/6/2024                         Would you like a call back once the refill request has been completed: [] Yes [] No    If the office needs to give you a call back, can they leave a voicemail: [] Yes [] No    Toyin Ugarte MA  10/30/23, 08:06 EDT

## 2023-11-30 DIAGNOSIS — E03.9 HYPOTHYROIDISM, UNSPECIFIED TYPE: ICD-10-CM

## 2023-11-30 RX ORDER — LISINOPRIL 20 MG/1
20 TABLET ORAL DAILY
Qty: 90 TABLET | Refills: 3 | Status: SHIPPED | OUTPATIENT
Start: 2023-11-30

## 2023-11-30 NOTE — TELEPHONE ENCOUNTER
Caller: David Ester ETHAN    Relationship: Self    Best call back number: [phone]     Requested Prescriptions:   Requested Prescriptions     Pending Prescriptions Disp Refills    lisinopril (PRINIVIL,ZESTRIL) 20 MG tablet 90 tablet 3     Sig: Take 1 tablet by mouth Daily.        Pharmacy where request should be sent: [facility] [address] [phone] [facility] [phone]      Last office visit with prescribing clinician: 4/28/2023   Last telemedicine visit with prescribing clinician: Visit date not found   Next office visit with prescribing clinician: 5/6/2024       Does the patient have less than a 3 day supply:  [] Yes  [x] No      Leanne Laura Rep   11/30/23 10:41 EST

## 2024-01-29 RX ORDER — CETIRIZINE HYDROCHLORIDE 10 MG/1
TABLET ORAL
Qty: 90 TABLET | Refills: 0 | Status: SHIPPED | OUTPATIENT
Start: 2024-01-29

## 2024-02-02 ENCOUNTER — TRANSCRIBE ORDERS (OUTPATIENT)
Dept: ADMINISTRATIVE | Facility: HOSPITAL | Age: 69
End: 2024-02-02
Payer: OTHER GOVERNMENT

## 2024-02-02 ENCOUNTER — TELEPHONE (OUTPATIENT)
Dept: FAMILY MEDICINE CLINIC | Facility: CLINIC | Age: 69
End: 2024-02-02
Payer: OTHER GOVERNMENT

## 2024-02-02 DIAGNOSIS — E03.9 HYPOTHYROIDISM, UNSPECIFIED TYPE: ICD-10-CM

## 2024-02-02 DIAGNOSIS — Z12.31 VISIT FOR SCREENING MAMMOGRAM: Primary | ICD-10-CM

## 2024-02-02 RX ORDER — LISINOPRIL 20 MG/1
20 TABLET ORAL DAILY
Qty: 90 TABLET | Refills: 1 | Status: SHIPPED | OUTPATIENT
Start: 2024-02-02

## 2024-02-02 NOTE — TELEPHONE ENCOUNTER
Caller: Ester Hernandez    Relationship: Self    Best call back number:  [phone] (home)     Requested Prescriptions:   Requested Prescriptions     Pending Prescriptions Disp Refills    lisinopril (PRINIVIL,ZESTRIL) 20 MG tablet 90 tablet 3     Sig: Take 1 tablet by mouth Daily.        Pharmacy where request should be sent: [id number] [address]  AT [facility]  & [address] [facility] [phone] [facility] [phone] FX     Last office visit with prescribing clinician: 4/28/2023   Last telemedicine visit with prescribing clinician: Visit date not found   Next office visit with prescribing clinician: 5/6/2024     Additional details provided by patient:     Does the patient have less than a 3 day supply:  [] Yes  [x] No    Would you like a call back once the refill request has been completed: [] Yes [x] No    If the office needs to give you a call back, can they leave a voicemail: [] Yes [x] No

## 2024-02-02 NOTE — TELEPHONE ENCOUNTER
Caller: Ester Hernandez    Relationship: Self    Best call back number: [phone] (home)     What orders are you requesting (i.e. lab or imaging):  BLOOD WORK     In what timeframe would the patient need to come in: BEFORE 5-6-24 MEDICARE WELLNESS VISIT     Additional notes: PLEASE CALL WHEN ORDER IS IN COMPUTER

## 2024-02-05 DIAGNOSIS — E03.9 HYPOTHYROIDISM, UNSPECIFIED TYPE: Primary | ICD-10-CM

## 2024-02-05 DIAGNOSIS — I10 PRIMARY HYPERTENSION: ICD-10-CM

## 2024-02-05 DIAGNOSIS — E55.9 VITAMIN D DEFICIENCY: ICD-10-CM

## 2024-02-05 NOTE — TELEPHONE ENCOUNTER
Rios Chapman DO [facility] Pool59 minutes ago (8:09 AM)       Orders are in   Patient notified.

## 2024-03-07 ENCOUNTER — HOSPITAL ENCOUNTER (OUTPATIENT)
Dept: MAMMOGRAPHY | Facility: HOSPITAL | Age: 69
Discharge: HOME | End: 2024-03-07
Admitting: INTERNAL MEDICINE
Payer: MEDICARE

## 2024-03-07 DIAGNOSIS — Z12.31 VISIT FOR SCREENING MAMMOGRAM: ICD-10-CM

## 2024-03-07 PROCEDURE — 77063 BREAST TOMOSYNTHESIS BI: CPT

## 2024-03-07 PROCEDURE — 77067 SCR MAMMO BI INCL CAD: CPT

## 2024-04-30 ENCOUNTER — LAB (OUTPATIENT)
Dept: LAB | Facility: HOSPITAL | Age: 69
End: 2024-04-30
Payer: MEDICARE

## 2024-04-30 DIAGNOSIS — I10 PRIMARY HYPERTENSION: ICD-10-CM

## 2024-04-30 DIAGNOSIS — E55.9 VITAMIN D DEFICIENCY: ICD-10-CM

## 2024-04-30 DIAGNOSIS — E03.9 HYPOTHYROIDISM, UNSPECIFIED TYPE: ICD-10-CM

## 2024-04-30 LAB
25(OH)D3 SERPL-MCNC: 32.5 NG/ML (ref 30–100)
ALBUMIN SERPL-MCNC: 4.2 G/DL (ref 3.5–5.2)
ALBUMIN/GLOB SERPL: 1.6 G/DL
ALP SERPL-CCNC: 68 U/L (ref 39–117)
ALT SERPL W P-5'-P-CCNC: 16 U/L (ref 1–33)
ANION GAP SERPL CALCULATED.3IONS-SCNC: 9.8 MMOL/L (ref 5–15)
AST SERPL-CCNC: 20 U/L (ref 1–32)
BASOPHILS # BLD AUTO: 0.02 10*3/MM3 (ref 0–0.2)
BASOPHILS NFR BLD AUTO: 0.3 % (ref 0–1.5)
BILIRUB SERPL-MCNC: 0.5 MG/DL (ref 0–1.2)
BUN SERPL-MCNC: 12 MG/DL (ref 8–23)
BUN/CREAT SERPL: 17.1 (ref 7–25)
CALCIUM SPEC-SCNC: 9.2 MG/DL (ref 8.6–10.5)
CHLORIDE SERPL-SCNC: 98 MMOL/L (ref 98–107)
CHOLEST SERPL-MCNC: 242 MG/DL (ref 0–200)
CO2 SERPL-SCNC: 25.2 MMOL/L (ref 22–29)
CREAT SERPL-MCNC: 0.7 MG/DL (ref 0.57–1)
DEPRECATED RDW RBC AUTO: 43.9 FL (ref 37–54)
EGFRCR SERPLBLD CKD-EPI 2021: 95.5 ML/MIN/1.73
EOSINOPHIL # BLD AUTO: 0.05 10*3/MM3 (ref 0–0.4)
EOSINOPHIL NFR BLD AUTO: 0.8 % (ref 0.3–6.2)
ERYTHROCYTE [DISTWIDTH] IN BLOOD BY AUTOMATED COUNT: 13.6 % (ref 12.3–15.4)
GLOBULIN UR ELPH-MCNC: 2.6 GM/DL
GLUCOSE SERPL-MCNC: 87 MG/DL (ref 65–99)
HCT VFR BLD AUTO: 41.6 % (ref 34–46.6)
HDLC SERPL-MCNC: 71 MG/DL (ref 40–60)
HGB BLD-MCNC: 13.5 G/DL (ref 12–15.9)
HOLD SPECIMEN: NORMAL
IMM GRANULOCYTES # BLD AUTO: 0.01 10*3/MM3 (ref 0–0.05)
IMM GRANULOCYTES NFR BLD AUTO: 0.2 % (ref 0–0.5)
LDLC SERPL CALC-MCNC: 158 MG/DL (ref 0–100)
LDLC/HDLC SERPL: 2.19 {RATIO}
LYMPHOCYTES # BLD AUTO: 1.53 10*3/MM3 (ref 0.7–3.1)
LYMPHOCYTES NFR BLD AUTO: 26 % (ref 19.6–45.3)
MCH RBC QN AUTO: 28.8 PG (ref 26.6–33)
MCHC RBC AUTO-ENTMCNC: 32.5 G/DL (ref 31.5–35.7)
MCV RBC AUTO: 88.7 FL (ref 79–97)
MONOCYTES # BLD AUTO: 0.38 10*3/MM3 (ref 0.1–0.9)
MONOCYTES NFR BLD AUTO: 6.5 % (ref 5–12)
NEUTROPHILS NFR BLD AUTO: 3.9 10*3/MM3 (ref 1.7–7)
NEUTROPHILS NFR BLD AUTO: 66.2 % (ref 42.7–76)
NRBC BLD AUTO-RTO: 0 /100 WBC (ref 0–0.2)
PLATELET # BLD AUTO: 270 10*3/MM3 (ref 140–450)
PMV BLD AUTO: 10.9 FL (ref 6–12)
POTASSIUM SERPL-SCNC: 5 MMOL/L (ref 3.5–5.2)
PROT SERPL-MCNC: 6.8 G/DL (ref 6–8.5)
RBC # BLD AUTO: 4.69 10*6/MM3 (ref 3.77–5.28)
SODIUM SERPL-SCNC: 133 MMOL/L (ref 136–145)
T4 FREE SERPL-MCNC: 1.02 NG/DL (ref 0.93–1.7)
TRIGL SERPL-MCNC: 76 MG/DL (ref 0–150)
TSH SERPL DL<=0.05 MIU/L-ACNC: 3.67 UIU/ML (ref 0.27–4.2)
VLDLC SERPL-MCNC: 13 MG/DL (ref 5–40)
WBC NRBC COR # BLD AUTO: 5.89 10*3/MM3 (ref 3.4–10.8)

## 2024-04-30 PROCEDURE — 80061 LIPID PANEL: CPT

## 2024-04-30 PROCEDURE — 84439 ASSAY OF FREE THYROXINE: CPT

## 2024-04-30 PROCEDURE — 83036 HEMOGLOBIN GLYCOSYLATED A1C: CPT

## 2024-04-30 PROCEDURE — 85025 COMPLETE CBC W/AUTO DIFF WBC: CPT

## 2024-04-30 PROCEDURE — 84443 ASSAY THYROID STIM HORMONE: CPT

## 2024-04-30 PROCEDURE — 82306 VITAMIN D 25 HYDROXY: CPT

## 2024-04-30 PROCEDURE — 80053 COMPREHEN METABOLIC PANEL: CPT

## 2024-04-30 PROCEDURE — 81003 URINALYSIS AUTO W/O SCOPE: CPT

## 2024-05-01 LAB
BILIRUB UR QL STRIP: NEGATIVE
CLARITY UR: CLEAR
COLOR UR: YELLOW
GLUCOSE UR STRIP-MCNC: NEGATIVE MG/DL
HBA1C MFR BLD: 5.5 % (ref 4.8–5.6)
HGB UR QL STRIP.AUTO: NEGATIVE
KETONES UR QL STRIP: NEGATIVE
LEUKOCYTE ESTERASE UR QL STRIP.AUTO: NEGATIVE
NITRITE UR QL STRIP: NEGATIVE
PH UR STRIP.AUTO: 6.5 [PH] (ref 5–8)
PROT UR QL STRIP: NEGATIVE
SP GR UR STRIP: 1.01 (ref 1–1.03)
UROBILINOGEN UR QL STRIP: NORMAL

## 2024-05-06 ENCOUNTER — OFFICE VISIT (OUTPATIENT)
Dept: FAMILY MEDICINE CLINIC | Facility: CLINIC | Age: 69
End: 2024-05-06
Payer: MEDICARE

## 2024-05-06 VITALS
BODY MASS INDEX: 25.53 KG/M2 | DIASTOLIC BLOOD PRESSURE: 84 MMHG | WEIGHT: 135.2 LBS | HEART RATE: 84 BPM | OXYGEN SATURATION: 98 % | HEIGHT: 61 IN | SYSTOLIC BLOOD PRESSURE: 130 MMHG

## 2024-05-06 DIAGNOSIS — Z00.00 PREVENTATIVE HEALTH CARE: ICD-10-CM

## 2024-05-06 DIAGNOSIS — E78.5 HYPERLIPIDEMIA, UNSPECIFIED HYPERLIPIDEMIA TYPE: ICD-10-CM

## 2024-05-06 DIAGNOSIS — I10 HYPERTENSION, UNSPECIFIED TYPE: ICD-10-CM

## 2024-05-06 DIAGNOSIS — Z00.00 MEDICARE ANNUAL WELLNESS VISIT, SUBSEQUENT: Primary | ICD-10-CM

## 2024-05-06 PROCEDURE — 3075F SYST BP GE 130 - 139MM HG: CPT | Performed by: INTERNAL MEDICINE

## 2024-05-06 PROCEDURE — G0439 PPPS, SUBSEQ VISIT: HCPCS | Performed by: INTERNAL MEDICINE

## 2024-05-06 PROCEDURE — 1160F RVW MEDS BY RX/DR IN RCRD: CPT | Performed by: INTERNAL MEDICINE

## 2024-05-06 PROCEDURE — 3079F DIAST BP 80-89 MM HG: CPT | Performed by: INTERNAL MEDICINE

## 2024-05-06 PROCEDURE — 1159F MED LIST DOCD IN RCRD: CPT | Performed by: INTERNAL MEDICINE

## 2024-05-06 PROCEDURE — 1170F FXNL STATUS ASSESSED: CPT | Performed by: INTERNAL MEDICINE

## 2024-05-06 PROCEDURE — 1126F AMNT PAIN NOTED NONE PRSNT: CPT | Performed by: INTERNAL MEDICINE

## 2024-05-07 ENCOUNTER — TELEPHONE (OUTPATIENT)
Dept: FAMILY MEDICINE CLINIC | Facility: CLINIC | Age: 69
End: 2024-05-07

## 2024-05-07 NOTE — TELEPHONE ENCOUNTER
Caller: Ester Hernandez    Relationship: Self    Best call back number: [phone]     What medication are you requesting: FISH OIL    If a prescription is needed, what is your preferred pharmacy and phone number: [id number] [place], [place] - [address]  AT [facility]  & [address] [facility] - [phone]  - [phone] FX

## 2024-05-09 NOTE — TELEPHONE ENCOUNTER
THIS WAS SUPPOSED TO BE A NEW PRESCRIPTION. THIS WAS DISCUSSED AT THE PATIENT'S APPOINTMENT ON MONDAY AND NO NEW PRESCRIPTION HAS BEEN SENT. PLEASE EXPEDITE.

## 2024-05-13 DIAGNOSIS — E78.5 DYSLIPIDEMIA: Primary | ICD-10-CM

## 2024-05-13 RX ORDER — CHLORAL HYDRATE 500 MG
1000 CAPSULE ORAL
Qty: 90 CAPSULE | Refills: 3 | Status: SHIPPED | OUTPATIENT
Start: 2024-05-13

## 2024-08-14 DIAGNOSIS — E03.9 HYPOTHYROIDISM, UNSPECIFIED TYPE: ICD-10-CM

## 2024-08-15 RX ORDER — LISINOPRIL 20 MG/1
20 TABLET ORAL DAILY
Qty: 90 TABLET | Refills: 1 | Status: SHIPPED | OUTPATIENT
Start: 2024-08-15

## 2024-08-15 NOTE — TELEPHONE ENCOUNTER
Rx Refill Note  Requested Prescriptions     Pending Prescriptions Disp Refills    lisinopril (PRINIVIL,ZESTRIL) 20 MG tablet [Pharmacy Med Name: LISINOPRIL 20MG TABLETS] 90 tablet 1     Sig: TAKE 1 TABLET BY MOUTH DAILY      Last office visit with prescribing clinician: 5/6/2024   Last telemedicine visit with prescribing clinician: Visit date not found   Next office visit with prescribing clinician: 5/8/2025                         Would you like a call back once the refill request has been completed: [] Yes [] No    If the office needs to give you a call back, can they leave a voicemail: [] Yes [] No    Arianna Galicia MA  08/15/24, 09:08 EDT

## 2024-08-19 RX ORDER — CETIRIZINE HYDROCHLORIDE 10 MG/1
TABLET ORAL
Qty: 90 TABLET | Refills: 0 | Status: SHIPPED | OUTPATIENT
Start: 2024-08-19

## 2024-08-19 NOTE — TELEPHONE ENCOUNTER
Rx Refill Note  Requested Prescriptions     Pending Prescriptions Disp Refills    cetirizine (zyrTEC) 10 MG tablet [Pharmacy Med Name: CETIRIZINE 10MG TABLETS] 90 tablet 0     Sig: TAKE 1 TABLET BY MOUTH EVERY DAY      Last office visit with prescribing clinician: 5/6/2024   Last telemedicine visit with prescribing clinician: Visit date not found   Next office visit with prescribing clinician: 5/8/2025                         Would you like a call back once the refill request has been completed: [] Yes [] No    If the office needs to give you a call back, can they leave a voicemail: [] Yes [] No    Arianna Galicia MA  08/19/24, 08:25 EDT  
none

## 2024-10-03 ENCOUNTER — OFFICE VISIT (OUTPATIENT)
Dept: FAMILY MEDICINE CLINIC | Facility: CLINIC | Age: 69
End: 2024-10-03
Payer: MEDICARE

## 2024-10-03 ENCOUNTER — LAB (OUTPATIENT)
Dept: LAB | Facility: HOSPITAL | Age: 69
End: 2024-10-03
Payer: MEDICARE

## 2024-10-03 VITALS
BODY MASS INDEX: 26.32 KG/M2 | OXYGEN SATURATION: 98 % | HEIGHT: 61 IN | SYSTOLIC BLOOD PRESSURE: 132 MMHG | HEART RATE: 70 BPM | WEIGHT: 139.4 LBS | DIASTOLIC BLOOD PRESSURE: 88 MMHG

## 2024-10-03 DIAGNOSIS — Z23 IMMUNIZATION DUE: ICD-10-CM

## 2024-10-03 DIAGNOSIS — R10.84 GENERALIZED ABDOMINAL PAIN: Primary | ICD-10-CM

## 2024-10-03 DIAGNOSIS — R10.84 GENERALIZED ABDOMINAL PAIN: ICD-10-CM

## 2024-10-03 LAB
ALBUMIN SERPL-MCNC: 4.3 G/DL (ref 3.5–5.2)
ALBUMIN/GLOB SERPL: 1.7 G/DL
ALP SERPL-CCNC: 77 U/L (ref 39–117)
ALT SERPL W P-5'-P-CCNC: 17 U/L (ref 1–33)
ANION GAP SERPL CALCULATED.3IONS-SCNC: 13.4 MMOL/L (ref 5–15)
AST SERPL-CCNC: 21 U/L (ref 1–32)
BASOPHILS # BLD AUTO: 0.04 10*3/MM3 (ref 0–0.2)
BASOPHILS NFR BLD AUTO: 0.8 % (ref 0–1.5)
BILIRUB BLD-MCNC: NEGATIVE MG/DL
BILIRUB SERPL-MCNC: 0.4 MG/DL (ref 0–1.2)
BUN SERPL-MCNC: 10 MG/DL (ref 8–23)
BUN/CREAT SERPL: 12.2 (ref 7–25)
CALCIUM SPEC-SCNC: 9.1 MG/DL (ref 8.6–10.5)
CHLORIDE SERPL-SCNC: 100 MMOL/L (ref 98–107)
CLARITY, POC: CLEAR
CO2 SERPL-SCNC: 26.6 MMOL/L (ref 22–29)
COLOR UR: YELLOW
CREAT SERPL-MCNC: 0.82 MG/DL (ref 0.57–1)
DEPRECATED RDW RBC AUTO: 41.6 FL (ref 37–54)
EGFRCR SERPLBLD CKD-EPI 2021: 79 ML/MIN/1.73
EOSINOPHIL # BLD AUTO: 0.05 10*3/MM3 (ref 0–0.4)
EOSINOPHIL NFR BLD AUTO: 0.9 % (ref 0.3–6.2)
ERYTHROCYTE [DISTWIDTH] IN BLOOD BY AUTOMATED COUNT: 13.1 % (ref 12.3–15.4)
EXPIRATION DATE: ABNORMAL
GLOBULIN UR ELPH-MCNC: 2.6 GM/DL
GLUCOSE SERPL-MCNC: 87 MG/DL (ref 65–99)
GLUCOSE UR STRIP-MCNC: NEGATIVE MG/DL
HCT VFR BLD AUTO: 41.5 % (ref 34–46.6)
HGB BLD-MCNC: 14.1 G/DL (ref 12–15.9)
IMM GRANULOCYTES # BLD AUTO: 0.02 10*3/MM3 (ref 0–0.05)
IMM GRANULOCYTES NFR BLD AUTO: 0.4 % (ref 0–0.5)
KETONES UR QL: NEGATIVE
LEUKOCYTE EST, POC: NEGATIVE
LYMPHOCYTES # BLD AUTO: 1.52 10*3/MM3 (ref 0.7–3.1)
LYMPHOCYTES NFR BLD AUTO: 28.6 % (ref 19.6–45.3)
Lab: ABNORMAL
MCH RBC QN AUTO: 29.6 PG (ref 26.6–33)
MCHC RBC AUTO-ENTMCNC: 34 G/DL (ref 31.5–35.7)
MCV RBC AUTO: 87.2 FL (ref 79–97)
MONOCYTES # BLD AUTO: 0.33 10*3/MM3 (ref 0.1–0.9)
MONOCYTES NFR BLD AUTO: 6.2 % (ref 5–12)
NEUTROPHILS NFR BLD AUTO: 3.36 10*3/MM3 (ref 1.7–7)
NEUTROPHILS NFR BLD AUTO: 63.1 % (ref 42.7–76)
NITRITE UR-MCNC: NEGATIVE MG/ML
NRBC BLD AUTO-RTO: 0 /100 WBC (ref 0–0.2)
PH UR: 6 [PH] (ref 5–8)
PLATELET # BLD AUTO: 282 10*3/MM3 (ref 140–450)
PMV BLD AUTO: 10.6 FL (ref 6–12)
POTASSIUM SERPL-SCNC: 3.8 MMOL/L (ref 3.5–5.2)
PROT SERPL-MCNC: 6.9 G/DL (ref 6–8.5)
PROT UR STRIP-MCNC: NEGATIVE MG/DL
RBC # BLD AUTO: 4.76 10*6/MM3 (ref 3.77–5.28)
RBC # UR STRIP: NEGATIVE /UL
SODIUM SERPL-SCNC: 140 MMOL/L (ref 136–145)
SP GR UR: 1 (ref 1–1.03)
UROBILINOGEN UR QL: NORMAL
WBC NRBC COR # BLD AUTO: 5.32 10*3/MM3 (ref 3.4–10.8)

## 2024-10-03 PROCEDURE — 1159F MED LIST DOCD IN RCRD: CPT | Performed by: INTERNAL MEDICINE

## 2024-10-03 PROCEDURE — 85025 COMPLETE CBC W/AUTO DIFF WBC: CPT | Performed by: INTERNAL MEDICINE

## 2024-10-03 PROCEDURE — 81003 URINALYSIS AUTO W/O SCOPE: CPT | Performed by: INTERNAL MEDICINE

## 2024-10-03 PROCEDURE — G0008 ADMIN INFLUENZA VIRUS VAC: HCPCS | Performed by: INTERNAL MEDICINE

## 2024-10-03 PROCEDURE — 80053 COMPREHEN METABOLIC PANEL: CPT | Performed by: INTERNAL MEDICINE

## 2024-10-03 PROCEDURE — 1160F RVW MEDS BY RX/DR IN RCRD: CPT | Performed by: INTERNAL MEDICINE

## 2024-10-03 PROCEDURE — 99213 OFFICE O/P EST LOW 20 MIN: CPT | Performed by: INTERNAL MEDICINE

## 2024-10-03 PROCEDURE — 3079F DIAST BP 80-89 MM HG: CPT | Performed by: INTERNAL MEDICINE

## 2024-10-03 PROCEDURE — 87086 URINE CULTURE/COLONY COUNT: CPT | Performed by: INTERNAL MEDICINE

## 2024-10-03 PROCEDURE — 90662 IIV NO PRSV INCREASED AG IM: CPT | Performed by: INTERNAL MEDICINE

## 2024-10-03 PROCEDURE — 3075F SYST BP GE 130 - 139MM HG: CPT | Performed by: INTERNAL MEDICINE

## 2024-10-03 PROCEDURE — 1126F AMNT PAIN NOTED NONE PRSNT: CPT | Performed by: INTERNAL MEDICINE

## 2024-10-03 NOTE — PROGRESS NOTES
Chief Complaint   Patient presents with    GI Problem     Having stomach pain for about a week, recently just finished medication for UTI. No irregular bowel movements.        HPI:  Ester Hernandez is a 68 y.o. female who presents today for set stomach, bloating and cramping after taking antibiotics for UTI.  Symptoms improving.    ROS:  Constitutional: no fevers, night sweats or unexplained weight loss  Eyes: no vision changes  ENT: no runny nose, ear pain, sore throat  Cardio: no chest pain, palpitations  Pulm: no shortness of breath, wheezing, or cough  GI: no abdominal pain or changes in bowel movements  : no difficulty urinating  MSK: no difficulty ambulating, no joint pain  Neuro: no weakness, dizziness or headache  Psych: no trouble sleeping  Endo: no change in appetite      Past Medical History:   Diagnosis Date    Abdominal pain     Acute URI     Breast cancer 2004    right    Breast cancer     Cyst of left upper eyelid     Hx of radiation therapy     Immunization deficiency     Radiation 2004    right    Seasonal allergies     Sinusitis       Family History   Problem Relation Age of Onset    Lung cancer Mother     Hypertension Father     Diabetes Father     COPD Brother     Breast cancer Neg Hx     Ovarian cancer Neg Hx       Social History     Socioeconomic History    Marital status:    Tobacco Use    Smoking status: Never    Smokeless tobacco: Never   Vaping Use    Vaping status: Never Used   Substance and Sexual Activity    Alcohol use: No    Drug use: No    Sexual activity: Yes     Partners: Male     Birth control/protection: None     Comment:       Allergies   Allergen Reactions    Venlafaxine Anxiety     And weakness.      Amoxicillin     Azithromycin     Cephalexin     Ciprofloxacin     Clarithromycin     Doxycycline     Doxycycline Monohydrate     Erythromycin     Levofloxacin     Loratadine     Bactrim [Sulfamethoxazole-Trimethoprim] Rash      Immunization History   Administered  Date(s) Administered    COVID-19 (PFIZER) Purple Cap Monovalent 03/23/2021, 04/24/2021    Flu Vaccine Quad PF >36MO 01/09/2019, 11/09/2020, 09/11/2021    Fluzone  >6mos 09/30/2010, 01/15/2018    Fluzone (or Fluarix & Flulaval for VFC) >6mos 01/09/2019, 11/09/2020, 09/11/2021    Fluzone High-Dose 65+YRS 10/03/2024    Fluzone High-Dose 65+yrs 01/04/2024    Fluzone Quad >6mos (Multi-dose) 11/03/2016, 01/15/2018    Influenza Injectable Mdck Pf Quad 09/28/2022    Influenza, Unspecified 09/28/2022    Pneumococcal Conjugate 20-Valent (PCV20) 04/28/2023    Tdap 12/20/2010, 08/25/2020        PE:  Vitals:    10/03/24 0827   BP: 132/88   Pulse: 70   SpO2: 98%      Body mass index is 26.35 kg/m².    Gen Appearance: NAD  HEENT: Normocephalic, PERRLA, no thyromegaly, trache midline  Heart: RRR, normal S1 and S2, no murmur  Lungs: CTA b/l, no wheezing, no crackles  Abdomen: Soft, non-tender, non-distended, no guarding and BSx4  MSK: Moves all extremities well, normal gait, no peripheral edema  Pulses: Palpable and equal b/l  Lymph nodes: No palpable lymphadenopathy   Neuro: No focal deficits      Current Outpatient Medications   Medication Sig Dispense Refill    amitriptyline (ELAVIL) 10 MG tablet TAKE 1 TO 2 TABLETS BY MOUTH EVERY NIGHT AT BEDTIME 180 tablet 0    cetirizine (zyrTEC) 10 MG tablet TAKE 1 TABLET BY MOUTH EVERY DAY 90 tablet 0    cholecalciferol (VITAMIN D3) 25 MCG (1000 UT) tablet Take 1 tablet by mouth Daily. 90 tablet 3    coenzyme Q10 100 MG capsule Take 1 capsule by mouth Daily. 90 capsule 3    lisinopril (PRINIVIL,ZESTRIL) 20 MG tablet TAKE 1 TABLET BY MOUTH DAILY 90 tablet 1    Omega-3 Fatty Acids (fish oil) 1000 MG capsule capsule Take 1 capsule by mouth Daily With Breakfast. 90 capsule 3    Probiotic Product (PROBIOTIC DAILY) capsule Take 1 each by mouth Daily. 90 capsule 3    hydrocortisone 1 % cream Apply 1 application topically to the appropriate area as directed 2 (Two) Times a Day. (Patient not taking:  Reported on 5/6/2024) 30 g 0     No current facility-administered medications for this visit.      Likely adverse reaction from taking antibiotics for UTI.  UTI symptoms have resolved, abdominal cramping improving.  Checking labs today, urine culture negative for infection.    Diagnoses and all orders for this visit:    1. Generalized abdominal pain (Primary)  -     Comprehensive Metabolic Panel; Future  -     CBC & Differential; Future  -     Cancel: Urinalysis With Culture If Indicated -; Future  -     POC Urinalysis Dipstick, Automated  -     Urine Culture - Urine, Urine, Clean Catch; Future  -     Comprehensive Metabolic Panel  -     CBC & Differential    2. Immunization due    Other orders  -     Fluzone High-Dose 65+yrs         No follow-ups on file.     Dictated Utilizing Dragon Dictation    Please note that portions of this note were completed with a voice recognition program.    Part of this note may be an electronic transcription/translation of spoken language to printed text using the Dragon Dictation System.

## 2024-10-05 LAB — BACTERIA SPEC AEROBE CULT: NO GROWTH

## 2024-11-14 DIAGNOSIS — G47.00 INSOMNIA, UNSPECIFIED TYPE: ICD-10-CM

## 2024-11-14 RX ORDER — AMITRIPTYLINE HYDROCHLORIDE 10 MG/1
TABLET ORAL
Qty: 180 TABLET | Refills: 0 | Status: SHIPPED | OUTPATIENT
Start: 2024-11-14

## 2024-11-14 NOTE — TELEPHONE ENCOUNTER
Rx Refill Note  Requested Prescriptions     Pending Prescriptions Disp Refills    amitriptyline (ELAVIL) 10 MG tablet [Pharmacy Med Name: AMITRIPTYLINE 10MG TABLETS] 180 tablet 0     Sig: TAKE 1 TO 2 TABLETS BY MOUTH EVERY NIGHT AT BEDTIME      Last office visit with prescribing clinician: 10/3/2024   Last telemedicine visit with prescribing clinician: Visit date not found   Next office visit with prescribing clinician: 5/8/2025                         Would you like a call back once the refill request has been completed: [] Yes [] No    If the office needs to give you a call back, can they leave a voicemail: [] Yes [] No    Arianna Galicia MA  11/14/24, 08:44 EST

## 2025-01-29 ENCOUNTER — TRANSCRIBE ORDERS (OUTPATIENT)
Dept: ADMINISTRATIVE | Facility: HOSPITAL | Age: 70
End: 2025-01-29
Payer: MEDICARE

## 2025-01-29 DIAGNOSIS — Z12.31 VISIT FOR SCREENING MAMMOGRAM: Primary | ICD-10-CM

## 2025-02-11 ENCOUNTER — APPOINTMENT (OUTPATIENT)
Facility: HOSPITAL | Age: 70
End: 2025-02-11
Payer: MEDICARE

## 2025-02-11 ENCOUNTER — TELEPHONE (OUTPATIENT)
Dept: INTERNAL MEDICINE | Facility: CLINIC | Age: 70
End: 2025-02-11
Payer: MEDICARE

## 2025-02-11 ENCOUNTER — HOSPITAL ENCOUNTER (EMERGENCY)
Facility: HOSPITAL | Age: 70
Discharge: HOME | End: 2025-02-11
Attending: EMERGENCY MEDICINE | Admitting: EMERGENCY MEDICINE
Payer: MEDICARE

## 2025-02-11 VITALS
HEIGHT: 60 IN | OXYGEN SATURATION: 100 % | DIASTOLIC BLOOD PRESSURE: 114 MMHG | BODY MASS INDEX: 27.68 KG/M2 | HEART RATE: 96 BPM | SYSTOLIC BLOOD PRESSURE: 140 MMHG | WEIGHT: 141 LBS | RESPIRATION RATE: 22 BRPM | TEMPERATURE: 96.2 F

## 2025-02-11 DIAGNOSIS — I10 PRIMARY HYPERTENSION: Primary | ICD-10-CM

## 2025-02-11 LAB
ALBUMIN SERPL-MCNC: 4.4 G/DL (ref 3.5–5.2)
ALBUMIN/GLOB SERPL: 1.6 G/DL
ALP SERPL-CCNC: 77 U/L (ref 39–117)
ALT SERPL W P-5'-P-CCNC: 20 U/L (ref 1–33)
ANION GAP SERPL CALCULATED.3IONS-SCNC: 13.2 MMOL/L (ref 5–15)
AST SERPL-CCNC: 27 U/L (ref 1–32)
BASOPHILS # BLD AUTO: 0.02 10*3/MM3 (ref 0–0.2)
BASOPHILS NFR BLD AUTO: 0.2 % (ref 0–1.5)
BILIRUB SERPL-MCNC: 0.3 MG/DL (ref 0–1.2)
BUN SERPL-MCNC: 14 MG/DL (ref 8–23)
BUN/CREAT SERPL: 21.2 (ref 7–25)
CALCIUM SPEC-SCNC: 9.3 MG/DL (ref 8.6–10.5)
CHLORIDE SERPL-SCNC: 95 MMOL/L (ref 98–107)
CO2 SERPL-SCNC: 26.8 MMOL/L (ref 22–29)
CREAT SERPL-MCNC: 0.66 MG/DL (ref 0.57–1)
DEPRECATED RDW RBC AUTO: 43.2 FL (ref 37–54)
EGFRCR SERPLBLD CKD-EPI 2021: 96.3 ML/MIN/1.73
EOSINOPHIL # BLD AUTO: 0.01 10*3/MM3 (ref 0–0.4)
EOSINOPHIL NFR BLD AUTO: 0.1 % (ref 0.3–6.2)
ERYTHROCYTE [DISTWIDTH] IN BLOOD BY AUTOMATED COUNT: 13.4 % (ref 12.3–15.4)
GEN 5 1HR TROPONIN T REFLEX: <6 NG/L
GLOBULIN UR ELPH-MCNC: 2.8 GM/DL
GLUCOSE SERPL-MCNC: 112 MG/DL (ref 65–99)
HCT VFR BLD AUTO: 42.5 % (ref 34–46.6)
HGB BLD-MCNC: 14.1 G/DL (ref 12–15.9)
HOLD SPECIMEN: NORMAL
IMM GRANULOCYTES # BLD AUTO: 0.01 10*3/MM3 (ref 0–0.05)
IMM GRANULOCYTES NFR BLD AUTO: 0.1 % (ref 0–0.5)
LYMPHOCYTES # BLD AUTO: 1.27 10*3/MM3 (ref 0.7–3.1)
LYMPHOCYTES NFR BLD AUTO: 13.6 % (ref 19.6–45.3)
MCH RBC QN AUTO: 28.9 PG (ref 26.6–33)
MCHC RBC AUTO-ENTMCNC: 33.2 G/DL (ref 31.5–35.7)
MCV RBC AUTO: 87.1 FL (ref 79–97)
MONOCYTES # BLD AUTO: 0.31 10*3/MM3 (ref 0.1–0.9)
MONOCYTES NFR BLD AUTO: 3.3 % (ref 5–12)
NEUTROPHILS NFR BLD AUTO: 7.69 10*3/MM3 (ref 1.7–7)
NEUTROPHILS NFR BLD AUTO: 82.7 % (ref 42.7–76)
PLATELET # BLD AUTO: 289 10*3/MM3 (ref 140–450)
PMV BLD AUTO: 10.3 FL (ref 6–12)
POTASSIUM SERPL-SCNC: 3.6 MMOL/L (ref 3.5–5.2)
PROT SERPL-MCNC: 7.2 G/DL (ref 6–8.5)
RBC # BLD AUTO: 4.88 10*6/MM3 (ref 3.77–5.28)
SODIUM SERPL-SCNC: 135 MMOL/L (ref 136–145)
TROPONIN T NUMERIC DELTA: NORMAL
TROPONIN T SERPL HS-MCNC: <6 NG/L
WBC NRBC COR # BLD AUTO: 9.31 10*3/MM3 (ref 3.4–10.8)
WHOLE BLOOD HOLD COAG: NORMAL
WHOLE BLOOD HOLD SPECIMEN: NORMAL

## 2025-02-11 PROCEDURE — 80053 COMPREHEN METABOLIC PANEL: CPT | Performed by: EMERGENCY MEDICINE

## 2025-02-11 PROCEDURE — 36415 COLL VENOUS BLD VENIPUNCTURE: CPT

## 2025-02-11 PROCEDURE — 99284 EMERGENCY DEPT VISIT MOD MDM: CPT

## 2025-02-11 PROCEDURE — 93005 ELECTROCARDIOGRAM TRACING: CPT | Performed by: EMERGENCY MEDICINE

## 2025-02-11 PROCEDURE — 93005 ELECTROCARDIOGRAM TRACING: CPT

## 2025-02-11 PROCEDURE — 71045 X-RAY EXAM CHEST 1 VIEW: CPT

## 2025-02-11 PROCEDURE — 84484 ASSAY OF TROPONIN QUANT: CPT | Performed by: EMERGENCY MEDICINE

## 2025-02-11 PROCEDURE — 85025 COMPLETE CBC W/AUTO DIFF WBC: CPT | Performed by: EMERGENCY MEDICINE

## 2025-02-11 RX ORDER — CETIRIZINE HYDROCHLORIDE 10 MG/1
TABLET ORAL
Qty: 90 TABLET | Refills: 0 | Status: SHIPPED | OUTPATIENT
Start: 2025-02-11

## 2025-02-11 RX ORDER — SODIUM CHLORIDE 0.9 % (FLUSH) 0.9 %
10 SYRINGE (ML) INJECTION AS NEEDED
Status: DISCONTINUED | OUTPATIENT
Start: 2025-02-11 | End: 2025-02-11 | Stop reason: HOSPADM

## 2025-02-11 NOTE — TELEPHONE ENCOUNTER
Caller: [facility] Hernandez    Relationship: Self    Best call back number: [phone]     What was the call regarding: [facility] IS CALLING AND WOULD LIKE TO ESTABLISH CARE WITH DR. MATSON. SHE HAS BEEN INFORMED DR. MATSON IS NOT TAKING ANY NEW PATIENTS AT THIS MOMENT BUT STATES ALIZE QUISPE SEES HER AND REFERRED HER TO [facility]. SHE WOULD LIKE TO KNOW IF THERE IS ANY WAY DR. MATSON CAN TAKE HER ON.

## 2025-02-11 NOTE — TELEPHONE ENCOUNTER
Notified patient that dr lombardo is completely booked up. Gave her a list of providers in office accepting new patients.

## 2025-02-11 NOTE — FSED PROVIDER NOTE
Subjective   History of Present Illness  Patient presents to the emergency department for hypertension.  Says that she took her amlodipine late today.  Notes that her blood pressures were elevated.  Says she has been under a lot of stress.  She notes some epigastric discomfort but denies any overt chest pain shortness of breath lightheadedness nausea vomiting or other symptoms.  No identifiable alleviating or exacerbating factors    History provided by:  Patient   used: No        Review of Systems   Constitutional:         Hypertension   All other systems reviewed and are negative.      Past Medical History:   Diagnosis Date    Abdominal pain     Acute URI     Breast cancer 2004    right    Breast cancer     Cyst of left upper eyelid     Hx of radiation therapy     Immunization deficiency     Radiation 2004    right    Seasonal allergies     Sinusitis        Allergies   Allergen Reactions    Venlafaxine Anxiety     And weakness.      Amoxicillin     Azithromycin     Cephalexin     Ciprofloxacin     Clarithromycin     Doxycycline     Doxycycline Monohydrate     Erythromycin     Levofloxacin     Loratadine     Bactrim [Sulfamethoxazole-Trimethoprim] Rash       Past Surgical History:   Procedure Laterality Date    BASAL CELL CARCINOMA EXCISION      BREAST BIOPSY Right 2004    stereo bx    BREAST LUMPECTOMY Right 2004    BREAST SURGERY      HYSTERECTOMY  2004    OOPHORECTOMY         Family History   Problem Relation Age of Onset    Lung cancer Mother     Hypertension Father     Diabetes Father     COPD Brother     Breast cancer Neg Hx     Ovarian cancer Neg Hx        Social History     Socioeconomic History    Marital status:    Tobacco Use    Smoking status: Never    Smokeless tobacco: Never   Vaping Use    Vaping status: Never Used   Substance and Sexual Activity    Alcohol use: No    Drug use: No    Sexual activity: Yes     Partners: Male     Birth control/protection: None     Comment:             Objective   Physical Exam  Vitals reviewed.   Constitutional:       Appearance: Normal appearance. She is not toxic-appearing.   HENT:      Head: Atraumatic.      Nose: Nose normal.      Mouth/Throat:      Mouth: Mucous membranes are moist.      Pharynx: Oropharynx is clear.   Eyes:      Extraocular Movements: Extraocular movements intact.      Pupils: Pupils are equal, round, and reactive to light.   Cardiovascular:      Rate and Rhythm: Regular rhythm. Tachycardia present.      Pulses: Normal pulses.      Heart sounds: Normal heart sounds.   Pulmonary:      Effort: Pulmonary effort is normal. No respiratory distress.      Breath sounds: Normal breath sounds. No stridor. No wheezing, rhonchi or rales.   Abdominal:      General: There is no distension.      Palpations: Abdomen is soft.      Tenderness: There is no abdominal tenderness. There is no guarding or rebound.   Musculoskeletal:         General: No swelling, tenderness, deformity or signs of injury. Normal range of motion.      Cervical back: Normal range of motion and neck supple.   Skin:     General: Skin is warm and dry.      Capillary Refill: Capillary refill takes less than 2 seconds.   Neurological:      General: No focal deficit present.      Mental Status: She is alert and oriented to person, place, and time.      Cranial Nerves: No cranial nerve deficit.      Motor: No weakness.   Psychiatric:         Mood and Affect: Mood normal.         Behavior: Behavior normal.         ECG 12 Lead      Date/Time: 2/11/2025 11:08 AM    Performed by: Lb Watson MD  Authorized by: Lb Watson MD  Interpreted by ED physician  Rhythm: sinus tachycardia  Rate: tachycardic  BPM: 116  QRS axis: normal  Conduction: conduction normal  ST Segments: ST segments normal  T Waves: T waves normal  Other: no other findings  Clinical impression: non-specific ECG               ED Course                                           Medical Decision  Making  Hemodynamically stable and afebrile.  Patient is having no active chest pain.  Is mildly hypertensive.  Laboratory evaluation is unremarkable and her blood pressure decreased spontaneously.  Encourage close follow-up with PCP.  Patient is feeling much better.  Discharge    Problems Addressed:  Primary hypertension: complicated acute illness or injury    Amount and/or Complexity of Data Reviewed  Labs: ordered. Decision-making details documented in ED Course.  Radiology: ordered. Decision-making details documented in ED Course.  ECG/medicine tests: ordered and independent interpretation performed.    Risk  Prescription drug management.        Final diagnoses:   Primary hypertension       ED Disposition  ED Disposition       ED Disposition   Discharge    Condition   Stable    Comment   --               Rios Chapman,   [address] [place] [address]  [phone]    Schedule an appointment as soon as possible for a visit   As needed    [facility] EMERGENCY DEPARTMENT [place]  [address]  [facility] [address] [phone]  Go to   As needed         Medication List      No changes were made to your prescriptions during this visit.

## 2025-02-12 ENCOUNTER — OFFICE VISIT (OUTPATIENT)
Dept: FAMILY MEDICINE CLINIC | Facility: CLINIC | Age: 70
End: 2025-02-12
Payer: MEDICARE

## 2025-02-12 VITALS
HEART RATE: 100 BPM | OXYGEN SATURATION: 97 % | DIASTOLIC BLOOD PRESSURE: 90 MMHG | SYSTOLIC BLOOD PRESSURE: 136 MMHG | WEIGHT: 139.8 LBS | BODY MASS INDEX: 27.45 KG/M2 | HEIGHT: 60 IN

## 2025-02-12 DIAGNOSIS — G47.00 INSOMNIA, UNSPECIFIED TYPE: ICD-10-CM

## 2025-02-12 DIAGNOSIS — E03.9 HYPOTHYROIDISM, UNSPECIFIED TYPE: ICD-10-CM

## 2025-02-12 DIAGNOSIS — I10 PRIMARY HYPERTENSION: Primary | ICD-10-CM

## 2025-02-12 PROCEDURE — 1126F AMNT PAIN NOTED NONE PRSNT: CPT | Performed by: INTERNAL MEDICINE

## 2025-02-12 PROCEDURE — 3080F DIAST BP >= 90 MM HG: CPT | Performed by: INTERNAL MEDICINE

## 2025-02-12 PROCEDURE — 99215 OFFICE O/P EST HI 40 MIN: CPT | Performed by: INTERNAL MEDICINE

## 2025-02-12 PROCEDURE — 3075F SYST BP GE 130 - 139MM HG: CPT | Performed by: INTERNAL MEDICINE

## 2025-02-12 PROCEDURE — 1160F RVW MEDS BY RX/DR IN RCRD: CPT | Performed by: INTERNAL MEDICINE

## 2025-02-12 PROCEDURE — 1159F MED LIST DOCD IN RCRD: CPT | Performed by: INTERNAL MEDICINE

## 2025-02-12 RX ORDER — AMITRIPTYLINE HYDROCHLORIDE 10 MG/1
TABLET ORAL
Qty: 180 TABLET | Refills: 0 | Status: SHIPPED | OUTPATIENT
Start: 2025-02-12

## 2025-02-12 RX ORDER — LISINOPRIL 20 MG/1
20 TABLET ORAL DAILY
Qty: 90 TABLET | Refills: 1 | Status: SHIPPED | OUTPATIENT
Start: 2025-02-12

## 2025-02-12 NOTE — PROGRESS NOTES
Chief Complaint   Patient presents with    Hospital Follow Up Visit     [facility] ED on 02/11/2025, hypertension. Would like to discuss lab results. Questions about current medication.        HPI:  Ester Hernandez is a 69 y.o. female who presents today for ER follow-up hypertension.    ROS:  Constitutional: no fevers, night sweats or unexplained weight loss  Eyes: no vision changes  ENT: no runny nose, ear pain, sore throat  Cardio: no chest pain, palpitations  Pulm: no shortness of breath, wheezing, or cough  GI: no abdominal pain or changes in bowel movements  : no difficulty urinating  MSK: no difficulty ambulating, no joint pain  Neuro: no weakness, dizziness or headache  Psych: no trouble sleeping  Endo: no change in appetite      Past Medical History:   Diagnosis Date    Abdominal pain     Acute URI     Breast cancer 2004    right    Breast cancer     Cyst of left upper eyelid     Hx of radiation therapy     Immunization deficiency     Radiation 2004    right    Seasonal allergies     Sinusitis       Family History   Problem Relation Age of Onset    Lung cancer Mother     Hypertension Father     Diabetes Father     COPD Brother     Breast cancer Neg Hx     Ovarian cancer Neg Hx       Social History     Socioeconomic History    Marital status:    Tobacco Use    Smoking status: Never     Passive exposure: Never    Smokeless tobacco: Never   Vaping Use    Vaping status: Never Used   Substance and Sexual Activity    Alcohol use: No    Drug use: No    Sexual activity: Yes     Partners: Male     Birth control/protection: None     Comment:       Allergies   Allergen Reactions    Venlafaxine Anxiety     And weakness.      Amoxicillin     Azithromycin     Cephalexin     Ciprofloxacin     Clarithromycin     Doxycycline     Doxycycline Monohydrate     Erythromycin     Levofloxacin     Loratadine     Bactrim [Sulfamethoxazole-Trimethoprim] Rash      Immunization History   Administered Date(s) Administered     COVID-19 (PFIZER) Purple Cap Monovalent 03/23/2021, 04/24/2021    Flu Vaccine Quad PF >36MO 01/09/2019, 11/09/2020, 09/11/2021    Fluzone  >6mos 09/30/2010, 01/15/2018    Fluzone (or Fluarix & Flulaval for VFC) >6mos 01/09/2019, 11/09/2020, 09/11/2021    Fluzone High-Dose 65+YRS 10/03/2024    Fluzone High-Dose 65+yrs 01/04/2024    Fluzone Quad >6mos (Multi-dose) 11/03/2016, 01/15/2018    Influenza Injectable Mdck Pf Quad 09/28/2022    Influenza, Unspecified 09/28/2022    Pneumococcal Conjugate 20-Valent (PCV20) 04/28/2023    Tdap 12/20/2010, 08/25/2020        PE:  Vitals:    02/12/25 1042   BP: 136/90   Pulse: 100   SpO2: 97%      Body mass index is 27.3 kg/m².    Gen Appearance: NAD  HEENT: Normocephalic, PERRLA, no thyromegaly, trache midline  Heart: RRR, normal S1 and S2, no murmur  Lungs: CTA b/l, no wheezing, no crackles  Abdomen: Soft, non-tender, non-distended, no guarding and BSx4  MSK: Moves all extremities well, normal gait, no peripheral edema  Pulses: Palpable and equal b/l  Lymph nodes: No palpable lymphadenopathy   Neuro: No focal deficits      Current Outpatient Medications   Medication Sig Dispense Refill    amitriptyline (ELAVIL) 10 MG tablet TAKE 1 TO 2 TABLETS BY MOUTH EVERY NIGHT AT BEDTIME 180 tablet 0    cetirizine (zyrTEC) 10 MG tablet TAKE 1 TABLET BY MOUTH EVERY DAY 90 tablet 0    cholecalciferol (VITAMIN D3) 25 MCG (1000 UT) tablet Take 1 tablet by mouth Daily. 90 tablet 3    coenzyme Q10 100 MG capsule Take 1 capsule by mouth Daily. 90 capsule 3    lisinopril (PRINIVIL,ZESTRIL) 20 MG tablet TAKE 1 TABLET BY MOUTH DAILY 90 tablet 1    Omega-3 Fatty Acids (fish oil) 1000 MG capsule capsule Take 1 capsule by mouth Daily With Breakfast. 90 capsule 3    Probiotic Product (PROBIOTIC DAILY) capsule Take 1 each by mouth Daily. 90 capsule 3    hydrocortisone 1 % cream Apply 1 application topically to the appropriate area as directed 2 (Two) Times a Day. (Patient not taking: Reported on 2/12/2025)  30 g 0     No current facility-administered medications for this visit.      Borderline high today at 136/90.  Tachycardic, more anxiety as of late due to family stressors.  Monitoring blood pressure at home over the next 2 to 4 weeks.  Recheck blood work at that time.    Discussed insomnia.    Counseling was given to patient and family for the following topics: diagnostic results, impressions, risks and benefits of treatment options, and importance of treatment compliance . Total time of the encounter was 40 minutes and 20 minutes was spent face to face counseling.      Diagnoses and all orders for this visit:    1. Primary hypertension (Primary)    2. Insomnia, unspecified type         Return in about 4 weeks (around 3/12/2025) for htn.     Dictated Utilizing Dragon Dictation    Please note that portions of this note were completed with a voice recognition program.    Part of this note may be an electronic transcription/translation of spoken language to printed text using the Dragon Dictation System.

## 2025-02-12 NOTE — TELEPHONE ENCOUNTER
Rx Refill Note  Requested Prescriptions     Pending Prescriptions Disp Refills    lisinopril (PRINIVIL,ZESTRIL) 20 MG tablet [Pharmacy Med Name: LISINOPRIL 20MG TABLETS] 90 tablet 1     Sig: TAKE 1 TABLET BY MOUTH DAILY    amitriptyline (ELAVIL) 10 MG tablet [Pharmacy Med Name: AMITRIPTYLINE 10MG TABLETS] 180 tablet 0     Sig: TAKE 1 TO 2 TABLETS BY MOUTH EVERY NIGHT AT BEDTIME      Last office visit with prescribing clinician: 10/3/2024   Last telemedicine visit with prescribing clinician: Visit date not found   Next office visit with prescribing clinician: 5/8/2025                         Would you like a call back once the refill request has been completed: [] Yes [] No    If the office needs to give you a call back, can they leave a voicemail: [] Yes [] No    Arianna Galicia MA  02/12/25, 08:00 EST

## 2025-02-13 ENCOUNTER — PATIENT OUTREACH (OUTPATIENT)
Dept: CASE MANAGEMENT | Facility: OTHER | Age: 70
End: 2025-02-13
Payer: MEDICARE

## 2025-02-13 LAB
QT INTERVAL: 340 MS
QTC INTERVAL: 472 MS

## 2025-02-13 NOTE — OUTREACH NOTE
AMBULATORY CASE MANAGEMENT NOTE    Names and Relationships of Patient/Support Persons: Contact: Ester Hernandez; Relationship: Self -     Patient Outreach    Called pt seen in ED 2/11/25 for htn. Pt followed up with her pcp yesterday and has next f/u scheduled 3/12/25. She monitors and keeps log of her bp at home and takes her meds as directed. No needs voiced at this time.    Ladonna BRUNNER  Ambulatory Case Management    2/13/2025, 12:12 EST

## 2025-02-19 ENCOUNTER — TELEPHONE (OUTPATIENT)
Dept: FAMILY MEDICINE CLINIC | Facility: CLINIC | Age: 70
End: 2025-02-19

## 2025-02-19 NOTE — TELEPHONE ENCOUNTER
Caller: Ester Hernandez    Relationship: Self    Best call back number: [phone]     What form or medical record are you requesting: LETTER FROM PROVIDER STATING THAT YEAR PELVIC EXAM WAS NECESSARY. PATIENT HAD THAT DONE WITH [facility] WITH BAYRON BAIN. PATIENT IS NEEDING THIS SO THAT HER MEDICARE WILL COVER THERE PART.     Who is requesting this form or medical record from you: PATIENT [facility]/ MEDICARE INSURANCE    How would you like to receive the form or medical records (pick-up, mail, fax): UP LOAD TO [employer]  If fax, what is the fax number:   If mail, what is the address:   If pick-up, provide patient with address and location details    Timeframe paperwork needed: ASAP    Additional notes: PLEASE CALL PATIENT WITH ANY QUESTIONS

## 2025-02-23 LAB — NCCN CRITERIA FLAG: ABNORMAL

## 2025-02-24 ENCOUNTER — DOCUMENTATION (OUTPATIENT)
Dept: GENETICS | Facility: HOSPITAL | Age: 70
End: 2025-02-24
Payer: MEDICARE

## 2025-02-24 NOTE — PROGRESS NOTES
This patient recently completed the CARE risk assessment for a mammogram appointment. Based on the patient's responses, NCCN criteria for genetic testing was met.     Navigator follow-up:     I spoke with the patient regarding the risk assessment results and our genetic testing program.  She is interested in genetic testing, but has declined at this time.  She will contact me if she decides to proceed.

## 2025-02-26 ENCOUNTER — TELEPHONE (OUTPATIENT)
Dept: FAMILY MEDICINE CLINIC | Facility: CLINIC | Age: 70
End: 2025-02-26
Payer: MEDICARE

## 2025-02-26 DIAGNOSIS — R79.89 OTHER SPECIFIED ABNORMAL FINDINGS OF BLOOD CHEMISTRY: ICD-10-CM

## 2025-02-26 DIAGNOSIS — E03.9 HYPOTHYROIDISM, UNSPECIFIED TYPE: ICD-10-CM

## 2025-02-26 DIAGNOSIS — E55.9 VITAMIN D DEFICIENCY: ICD-10-CM

## 2025-02-26 DIAGNOSIS — E78.5 DYSLIPIDEMIA: ICD-10-CM

## 2025-02-26 DIAGNOSIS — I10 PRIMARY HYPERTENSION: Primary | ICD-10-CM

## 2025-02-26 NOTE — TELEPHONE ENCOUNTER
Caller: Ester Hernandez    Relationship: Self    Best call back number: [phone]     What orders are you requesting (i.e. lab or imaging): CBC AND FULL LABS     In what timeframe would the patient need to come in:  BEFORE NEXT APPT [id number]      Where will you receive your lab/imaging services: [facility]

## 2025-02-26 NOTE — TELEPHONE ENCOUNTER
Unable to reach Ester Hernandez by phone or leave message.    Hub may relay message and document.    Rios Chapman, DO  [place]  [facility]  Clinical Pool4 hours ago (9:00 AM)       Does she mean a referral? I have never had to write a letter for a pelvic exam before? That would usually be done by the provider who does the pelvic exam.

## 2025-02-26 NOTE — TELEPHONE ENCOUNTER
Name: Roc Hernandez    Relationship: Self    Best Callback Number:     HUB PROVIDED THE RELAY MESSAGE FROM THE OFFICE  Does she mean a referral? I have never had to write a letter for a pelvic exam before? That would usually be done by the provider who does the pelvic exam.     PATIENT: HAS FURTHER QUESTIONS AND WOULD LIKE A CALL BACK AT THE FOLLOWING PHONE [phone]    ADDITIONAL INFORMATION:  JUST NEED A LETTER STATING THAT YOU RECOMMEND ROC GETS A PELVIC EXAM YEARLY AS SHE IS A CANCER SURVIVOR. THIS IS SO MEDICARE WILL COVER THIS.

## 2025-02-27 NOTE — TELEPHONE ENCOUNTER
Unable to reach Ester ETHAN David by phone or leave message.    Hub may relay message and document.    Rios Chapman DO  [facility] Clinical Pool20 hours ago (4:03 PM)       That would need to by written by provider performing the pap/pelvic.   Also patients labs have been ordered by Dr Chapman.

## 2025-03-10 ENCOUNTER — HOSPITAL ENCOUNTER (OUTPATIENT)
Facility: HOSPITAL | Age: 70
Discharge: HOME | End: 2025-03-10
Payer: MEDICARE

## 2025-03-10 ENCOUNTER — LAB (OUTPATIENT)
Facility: HOSPITAL | Age: 70
End: 2025-03-10
Payer: MEDICARE

## 2025-03-10 DIAGNOSIS — I10 PRIMARY HYPERTENSION: ICD-10-CM

## 2025-03-10 DIAGNOSIS — Z12.31 VISIT FOR SCREENING MAMMOGRAM: ICD-10-CM

## 2025-03-10 DIAGNOSIS — R79.89 OTHER SPECIFIED ABNORMAL FINDINGS OF BLOOD CHEMISTRY: ICD-10-CM

## 2025-03-10 DIAGNOSIS — E78.5 DYSLIPIDEMIA: ICD-10-CM

## 2025-03-10 DIAGNOSIS — E03.9 HYPOTHYROIDISM, UNSPECIFIED TYPE: ICD-10-CM

## 2025-03-10 DIAGNOSIS — E55.9 VITAMIN D DEFICIENCY: ICD-10-CM

## 2025-03-10 LAB
25(OH)D3 SERPL-MCNC: 32.3 NG/ML (ref 30–100)
ALBUMIN SERPL-MCNC: 4 G/DL (ref 3.5–5.2)
ALBUMIN/GLOB SERPL: 1.4 G/DL
ALP SERPL-CCNC: 70 U/L (ref 39–117)
ALT SERPL W P-5'-P-CCNC: 13 U/L (ref 1–33)
ANION GAP SERPL CALCULATED.3IONS-SCNC: 9.9 MMOL/L (ref 5–15)
AST SERPL-CCNC: 21 U/L (ref 1–32)
BASOPHILS # BLD AUTO: 0.03 10*3/MM3 (ref 0–0.2)
BASOPHILS NFR BLD AUTO: 0.4 % (ref 0–1.5)
BILIRUB SERPL-MCNC: 0.3 MG/DL (ref 0–1.2)
BILIRUB UR QL STRIP: NEGATIVE
BUN SERPL-MCNC: 10 MG/DL (ref 8–23)
BUN/CREAT SERPL: 12.2 (ref 7–25)
CALCIUM SPEC-SCNC: 9.4 MG/DL (ref 8.6–10.5)
CHLORIDE SERPL-SCNC: 101 MMOL/L (ref 98–107)
CHOLEST SERPL-MCNC: 235 MG/DL (ref 0–200)
CLARITY UR: CLEAR
CO2 SERPL-SCNC: 25.1 MMOL/L (ref 22–29)
COLOR UR: YELLOW
CREAT SERPL-MCNC: 0.82 MG/DL (ref 0.57–1)
DEPRECATED RDW RBC AUTO: 43.9 FL (ref 37–54)
EGFRCR SERPLBLD CKD-EPI 2021: 78.5 ML/MIN/1.73
EOSINOPHIL # BLD AUTO: 0.05 10*3/MM3 (ref 0–0.4)
EOSINOPHIL NFR BLD AUTO: 0.7 % (ref 0.3–6.2)
ERYTHROCYTE [DISTWIDTH] IN BLOOD BY AUTOMATED COUNT: 13.2 % (ref 12.3–15.4)
GLOBULIN UR ELPH-MCNC: 2.9 GM/DL
GLUCOSE SERPL-MCNC: 95 MG/DL (ref 65–99)
GLUCOSE UR STRIP-MCNC: NEGATIVE MG/DL
HBA1C MFR BLD: 5.4 % (ref 4.8–5.6)
HCT VFR BLD AUTO: 43.6 % (ref 34–46.6)
HDLC SERPL-MCNC: 69 MG/DL (ref 40–60)
HGB BLD-MCNC: 14.1 G/DL (ref 12–15.9)
HGB UR QL STRIP.AUTO: NEGATIVE
HOLD SPECIMEN: NORMAL
IMM GRANULOCYTES # BLD AUTO: 0.02 10*3/MM3 (ref 0–0.05)
IMM GRANULOCYTES NFR BLD AUTO: 0.3 % (ref 0–0.5)
KETONES UR QL STRIP: NEGATIVE
LDLC SERPL CALC-MCNC: 151 MG/DL (ref 0–100)
LDLC/HDLC SERPL: 2.16 {RATIO}
LEUKOCYTE ESTERASE UR QL STRIP.AUTO: NEGATIVE
LYMPHOCYTES # BLD AUTO: 1.51 10*3/MM3 (ref 0.7–3.1)
LYMPHOCYTES NFR BLD AUTO: 22.5 % (ref 19.6–45.3)
MCH RBC QN AUTO: 29.1 PG (ref 26.6–33)
MCHC RBC AUTO-ENTMCNC: 32.3 G/DL (ref 31.5–35.7)
MCV RBC AUTO: 89.9 FL (ref 79–97)
MONOCYTES # BLD AUTO: 0.42 10*3/MM3 (ref 0.1–0.9)
MONOCYTES NFR BLD AUTO: 6.3 % (ref 5–12)
NEUTROPHILS NFR BLD AUTO: 4.69 10*3/MM3 (ref 1.7–7)
NEUTROPHILS NFR BLD AUTO: 69.8 % (ref 42.7–76)
NITRITE UR QL STRIP: NEGATIVE
NRBC BLD AUTO-RTO: 0 /100 WBC (ref 0–0.2)
PH UR STRIP.AUTO: 7 [PH] (ref 5–8)
PLATELET # BLD AUTO: 308 10*3/MM3 (ref 140–450)
PMV BLD AUTO: 10.5 FL (ref 6–12)
POTASSIUM SERPL-SCNC: 3.8 MMOL/L (ref 3.5–5.2)
PROT SERPL-MCNC: 6.9 G/DL (ref 6–8.5)
PROT UR QL STRIP: NEGATIVE
RBC # BLD AUTO: 4.85 10*6/MM3 (ref 3.77–5.28)
SODIUM SERPL-SCNC: 136 MMOL/L (ref 136–145)
SP GR UR STRIP: 1.01 (ref 1–1.03)
T4 FREE SERPL-MCNC: 1.1 NG/DL (ref 0.92–1.68)
TRIGL SERPL-MCNC: 85 MG/DL (ref 0–150)
TSH SERPL DL<=0.05 MIU/L-ACNC: 6.31 UIU/ML (ref 0.27–4.2)
UROBILINOGEN UR QL STRIP: NORMAL
VLDLC SERPL-MCNC: 15 MG/DL (ref 5–40)
WBC NRBC COR # BLD AUTO: 6.72 10*3/MM3 (ref 3.4–10.8)

## 2025-03-10 PROCEDURE — 77067 SCR MAMMO BI INCL CAD: CPT

## 2025-03-10 PROCEDURE — 81003 URINALYSIS AUTO W/O SCOPE: CPT

## 2025-03-10 PROCEDURE — 83036 HEMOGLOBIN GLYCOSYLATED A1C: CPT

## 2025-03-10 PROCEDURE — 80053 COMPREHEN METABOLIC PANEL: CPT

## 2025-03-10 PROCEDURE — 84443 ASSAY THYROID STIM HORMONE: CPT

## 2025-03-10 PROCEDURE — 80061 LIPID PANEL: CPT

## 2025-03-10 PROCEDURE — 77063 BREAST TOMOSYNTHESIS BI: CPT

## 2025-03-10 PROCEDURE — 85025 COMPLETE CBC W/AUTO DIFF WBC: CPT

## 2025-03-10 PROCEDURE — 84439 ASSAY OF FREE THYROXINE: CPT

## 2025-03-10 PROCEDURE — 82306 VITAMIN D 25 HYDROXY: CPT

## 2025-03-12 ENCOUNTER — OFFICE VISIT (OUTPATIENT)
Dept: FAMILY MEDICINE CLINIC | Facility: CLINIC | Age: 70
End: 2025-03-12
Payer: MEDICARE

## 2025-03-12 VITALS
SYSTOLIC BLOOD PRESSURE: 130 MMHG | BODY MASS INDEX: 27.33 KG/M2 | OXYGEN SATURATION: 97 % | HEIGHT: 60 IN | DIASTOLIC BLOOD PRESSURE: 82 MMHG | WEIGHT: 139.2 LBS | HEART RATE: 90 BPM

## 2025-03-12 DIAGNOSIS — E03.9 HYPOTHYROIDISM, UNSPECIFIED TYPE: ICD-10-CM

## 2025-03-12 DIAGNOSIS — G47.00 INSOMNIA, UNSPECIFIED TYPE: Primary | ICD-10-CM

## 2025-03-12 DIAGNOSIS — I10 PRIMARY HYPERTENSION: ICD-10-CM

## 2025-03-12 PROCEDURE — 1126F AMNT PAIN NOTED NONE PRSNT: CPT | Performed by: INTERNAL MEDICINE

## 2025-03-12 PROCEDURE — 3079F DIAST BP 80-89 MM HG: CPT | Performed by: INTERNAL MEDICINE

## 2025-03-12 PROCEDURE — 1159F MED LIST DOCD IN RCRD: CPT | Performed by: INTERNAL MEDICINE

## 2025-03-12 PROCEDURE — 99214 OFFICE O/P EST MOD 30 MIN: CPT | Performed by: INTERNAL MEDICINE

## 2025-03-12 PROCEDURE — 1160F RVW MEDS BY RX/DR IN RCRD: CPT | Performed by: INTERNAL MEDICINE

## 2025-03-12 PROCEDURE — 3075F SYST BP GE 130 - 139MM HG: CPT | Performed by: INTERNAL MEDICINE

## 2025-03-12 RX ORDER — AMITRIPTYLINE HYDROCHLORIDE 10 MG/1
20 TABLET ORAL NIGHTLY
Qty: 180 TABLET | Refills: 3 | Status: SHIPPED | OUTPATIENT
Start: 2025-03-12

## 2025-03-13 NOTE — PROGRESS NOTES
Chief Complaint   Patient presents with    Hypertension     Follow up        HPI:  Ester Hernandez is a 69 y.o. female who presents today for follow-up hypertension.  She would like to discuss increasing Elavil for insomnia.    ROS:  Constitutional: no fevers, night sweats or unexplained weight loss  Eyes: no vision changes  ENT: no runny nose, ear pain, sore throat  Cardio: no chest pain, palpitations  Pulm: no shortness of breath, wheezing, or cough  GI: no abdominal pain or changes in bowel movements  : no difficulty urinating  MSK: no difficulty ambulating, no joint pain  Neuro: no weakness, dizziness or headache  Psych: no trouble sleeping  Endo: no change in appetite      Past Medical History:   Diagnosis Date    Abdominal pain     Acute URI     Breast cancer 2004    right    Breast cancer     Cyst of left upper eyelid     Hx of radiation therapy     Immunization deficiency     Radiation 2004    right    Seasonal allergies     Sinusitis       Family History   Problem Relation Age of Onset    Lung cancer Mother     Hypertension Father     Diabetes Father     COPD Brother     Breast cancer Neg Hx     Ovarian cancer Neg Hx       Social History     Socioeconomic History    Marital status:    Tobacco Use    Smoking status: Never     Passive exposure: Never    Smokeless tobacco: Never   Vaping Use    Vaping status: Never Used   Substance and Sexual Activity    Alcohol use: No    Drug use: No    Sexual activity: Yes     Partners: Male     Birth control/protection: None     Comment:       Allergies   Allergen Reactions    Venlafaxine Anxiety     And weakness.      Amoxicillin     Azithromycin     Cephalexin     Ciprofloxacin     Clarithromycin     Doxycycline     Doxycycline Monohydrate     Erythromycin     Levofloxacin     Loratadine     Bactrim [Sulfamethoxazole-Trimethoprim] Rash      Immunization History   Administered Date(s) Administered    COVID-19 (PFIZER) Purple Cap Monovalent 03/23/2021,  04/24/2021    Flu Vaccine Quad PF >36MO 01/09/2019, 11/09/2020, 09/11/2021    Fluzone  >6mos 09/30/2010, 01/15/2018    Fluzone (or Fluarix & Flulaval for VFC) >6mos 01/09/2019, 11/09/2020, 09/11/2021    Fluzone High-Dose 65+YRS 10/03/2024    Fluzone High-Dose 65+yrs 01/04/2024    Fluzone Quad >6mos (Multi-dose) 11/03/2016, 01/15/2018    Influenza Injectable Mdck Pf Quad 09/28/2022    Influenza, Unspecified 09/28/2022    Pneumococcal Conjugate 20-Valent (PCV20) 04/28/2023    Tdap 12/20/2010, 08/25/2020        PE:  Vitals:    03/12/25 1252   BP: 130/82   Pulse: 90   SpO2: 97%      Body mass index is 27.19 kg/m².    Gen Appearance: NAD  HEENT: Normocephalic, PERRLA, no thyromegaly, trache midline  Heart: RRR, normal S1 and S2, no murmur  Lungs: CTA b/l, no wheezing, no crackles  Abdomen: Soft, non-tender, non-distended, no guarding and BSx4  MSK: Moves all extremities well, normal gait, no peripheral edema  Pulses: Palpable and equal b/l  Lymph nodes: No palpable lymphadenopathy   Neuro: No focal deficits      Current Outpatient Medications   Medication Sig Dispense Refill    cetirizine (zyrTEC) 10 MG tablet TAKE 1 TABLET BY MOUTH EVERY DAY 90 tablet 0    cholecalciferol (VITAMIN D3) 25 MCG (1000 UT) tablet Take 1 tablet by mouth Daily. 90 tablet 3    lisinopril (PRINIVIL,ZESTRIL) 20 MG tablet TAKE 1 TABLET BY MOUTH DAILY 90 tablet 1    Omega-3 Fatty Acids (fish oil) 1000 MG capsule capsule Take 1 capsule by mouth Daily With Breakfast. 90 capsule 3    Probiotic Product (PROBIOTIC DAILY) capsule Take 1 each by mouth Daily. 90 capsule 3    amitriptyline (ELAVIL) 10 MG tablet Take 2 tablets by mouth Every Night. 180 tablet 3    coenzyme Q10 100 MG capsule Take 1 capsule by mouth Daily. 90 capsule 3    hydrocortisone 1 % cream Apply 1 application topically to the appropriate area as directed 2 (Two) Times a Day. (Patient not taking: Reported on 2/12/2025) 30 g 0     No current facility-administered medications for this  visit.      Blood pressure well-controlled today, no change to BP meds.  Likely stress causing elevated readings.    Increase amitriptyline to 20 mg nightly.  Has helped with insomnia for the past week.    She would like to continue yearly gynecology exams/checkups.  She is established with a gynecologist but needs a letter for her insurance for coverage.  Will provide this today.    Diagnoses and all orders for this visit:    1. Insomnia, unspecified type (Primary)  -     amitriptyline (ELAVIL) 10 MG tablet; Take 2 tablets by mouth Every Night.  Dispense: 180 tablet; Refill: 3    2. Primary hypertension    3. Hypothyroidism, unspecified type         No follow-ups on file.     Dictated Utilizing Dragon Dictation    Please note that portions of this note were completed with a voice recognition program.    Part of this note may be an electronic transcription/translation of spoken language to printed text using the Dragon Dictation System.

## 2025-04-15 ENCOUNTER — TELEPHONE (OUTPATIENT)
Dept: FAMILY MEDICINE CLINIC | Facility: CLINIC | Age: 70
End: 2025-04-15
Payer: MEDICARE

## 2025-04-15 NOTE — TELEPHONE ENCOUNTER
Caller: Ester Hernandez    Relationship: Self    Best call back number: [phone]     What orders are you requesting (i.e. lab or imaging): MAGNESIUM    In what timeframe would the patient need to come in: AS SOON AS POSSIBLE    Where will you receive your lab/imaging services: [facility]

## 2025-04-21 ENCOUNTER — TELEPHONE (OUTPATIENT)
Dept: FAMILY MEDICINE CLINIC | Facility: CLINIC | Age: 70
End: 2025-04-21
Payer: MEDICARE

## 2025-04-21 NOTE — TELEPHONE ENCOUNTER
Please let patient know that her provider is out today and will place this order when he returns if he feels it is appropriate.

## 2025-04-21 NOTE — TELEPHONE ENCOUNTER
Caller: Ester Hernandez    Relationship: Self    Best call back number: [phone]     What medication are you requesting: FISH OIL - PATIENT WOULD LIKE A NORMAL SIZE PILL INSTEAD OF THE BIG PILL THAT IT HAS CAME IN .     If a prescription is needed, what is your preferred pharmacy and phone number: [id number] [place] [address] - [phone] [facility] [phone] FX     Additional notes:

## 2025-04-21 NOTE — TELEPHONE ENCOUNTER
Caller: Ester Hernandez    Relationship: Self    Best call back number: [phone]    What was the call regarding: CALLING TO CHECK STATUS OF LAB ORDER

## 2025-04-23 DIAGNOSIS — R79.0 LOW MAGNESIUM LEVEL: Primary | ICD-10-CM

## 2025-04-23 DIAGNOSIS — E78.5 DYSLIPIDEMIA: Primary | ICD-10-CM

## 2025-04-23 RX ORDER — OMEGA-3/DHA/EPA/FISH OIL 60 MG-90MG
1000 CAPSULE ORAL
Qty: 180 EACH | Refills: 3 | Status: SHIPPED | OUTPATIENT
Start: 2025-04-23

## 2025-04-23 NOTE — TELEPHONE ENCOUNTER
Left message for Ester Hernandez  to return my call.    Hub may relay message and document    Rios Chapman,        4/23/25 12:58 PM  Sent in 500mg tabs, might be smaller than 1000mg. She can ask pharmacist about OTC versions that may be different sizes.

## 2025-04-23 NOTE — TELEPHONE ENCOUNTER
Left message for Ester Hernandez  to return my call.    Hub may relay message and document    Magnesium lab ordered.

## 2025-04-25 ENCOUNTER — LAB (OUTPATIENT)
Facility: HOSPITAL | Age: 70
End: 2025-04-25
Payer: MEDICARE

## 2025-04-25 DIAGNOSIS — R79.0 LOW MAGNESIUM LEVEL: ICD-10-CM

## 2025-04-25 LAB — MAGNESIUM SERPL-MCNC: 2.1 MG/DL (ref 1.6–2.4)

## 2025-04-25 PROCEDURE — 36415 COLL VENOUS BLD VENIPUNCTURE: CPT

## 2025-04-25 PROCEDURE — 83735 ASSAY OF MAGNESIUM: CPT

## 2025-05-05 ENCOUNTER — TELEPHONE (OUTPATIENT)
Dept: FAMILY MEDICINE CLINIC | Facility: CLINIC | Age: 70
End: 2025-05-05

## 2025-05-05 NOTE — TELEPHONE ENCOUNTER
Caller: Ester Hernandez    Relationship: Self    Best call back number: [phone]     Which medication are you concerned about: OMEGA 3 FISH OIL    Who prescribed you this medication: DR BALDWIN    What are your concerns: PATIENT STATED THAT THE PHARMACY STATED THAT THIS PRESCRIPTION NEEDS TO BE WRITTEN FOR  MG. PATIENT ONLY HAS 2 DAYS SUPPLY LEFT.

## 2025-05-08 ENCOUNTER — OFFICE VISIT (OUTPATIENT)
Dept: FAMILY MEDICINE CLINIC | Facility: CLINIC | Age: 70
End: 2025-05-08
Payer: MEDICARE

## 2025-05-08 ENCOUNTER — TELEPHONE (OUTPATIENT)
Dept: FAMILY MEDICINE CLINIC | Facility: CLINIC | Age: 70
End: 2025-05-08

## 2025-05-08 VITALS
OXYGEN SATURATION: 99 % | BODY MASS INDEX: 27.48 KG/M2 | SYSTOLIC BLOOD PRESSURE: 120 MMHG | HEART RATE: 90 BPM | HEIGHT: 60 IN | DIASTOLIC BLOOD PRESSURE: 70 MMHG | WEIGHT: 140 LBS

## 2025-05-08 DIAGNOSIS — F41.9 ANXIETY: ICD-10-CM

## 2025-05-08 DIAGNOSIS — Z00.00 MEDICARE ANNUAL WELLNESS VISIT, SUBSEQUENT: Primary | ICD-10-CM

## 2025-05-08 DIAGNOSIS — Z78.0 POSTMENOPAUSAL: ICD-10-CM

## 2025-05-08 DIAGNOSIS — I10 HYPERTENSION, UNSPECIFIED TYPE: ICD-10-CM

## 2025-05-08 DIAGNOSIS — M85.89 OSTEOPENIA OF MULTIPLE SITES: ICD-10-CM

## 2025-05-08 DIAGNOSIS — E78.00 PURE HYPERCHOLESTEROLEMIA: ICD-10-CM

## 2025-05-08 RX ORDER — HYDROXYZINE HYDROCHLORIDE 10 MG/1
10 TABLET, FILM COATED ORAL NIGHTLY PRN
Qty: 30 TABLET | Refills: 0 | Status: SHIPPED | OUTPATIENT
Start: 2025-05-08

## 2025-05-08 NOTE — TELEPHONE ENCOUNTER
Caller: Ester Hernandez    Relationship: Self    Best call back number: [phone]     What is the best time to reach you: ANY    Who are you requesting to speak with (clinical staff, provider,  specific staff member): DR. BALDWIN OR NURSE    Do you know the name of the person who called: SELF    What was the call regarding: PATIENT RECEIVED Hydroxyzine TODAY. SHE ALSO TAKES Amitriptyline. SHE WANTED TO KNOW IF SHE SHOULD BE TAKING THESE TOGETHER, OR IF SHE SHOULD STOP THE Amitriptyline WHILE SHE IS TAKING THE Hydroxyzine.    Is it okay if the provider responds through MyChart: NO

## 2025-05-09 RX ORDER — OMEGA-3/DHA/EPA/FISH OIL 60 MG-90MG
500 CAPSULE ORAL 2 TIMES DAILY WITH MEALS
Qty: 180 CAPSULE | Refills: 3 | Status: SHIPPED | OUTPATIENT
Start: 2025-05-09

## 2025-05-09 NOTE — PROGRESS NOTES
The ABCs of the Annual Wellness Visit  Subsequent Medicare Wellness Visit    Subjective      Ester Hernandez is a 69 y.o. female who presents for a Subsequent Medicare Wellness Visit.    The following portions of the patient's history were reviewed and   updated as appropriate: allergies, current medications, past family history, past medical history, past social history, past surgical history, and problem list.    Compared to one year ago, the patient feels her physical   health is the same.    Compared to one year ago, the patient feels her mental   health is the same.    Recent Hospitalizations:  She was not admitted to the hospital during the last year.       Current Medical Providers:  Patient Care Team:  Rios Chapman,  as PCP - General (Internal Medicine)    Outpatient Medications Prior to Visit   Medication Sig Dispense Refill    amitriptyline (ELAVIL) 10 MG tablet Take 2 tablets by mouth Every Night. 180 tablet 3    cetirizine (zyrTEC) 10 MG tablet TAKE 1 TABLET BY MOUTH EVERY DAY 90 tablet 0    cholecalciferol (VITAMIN D3) 25 MCG (1000 UT) tablet Take 1 tablet by mouth Daily. 90 tablet 3    lisinopril (PRINIVIL,ZESTRIL) 20 MG tablet TAKE 1 TABLET BY MOUTH DAILY 90 tablet 1    Omega-3 Fatty Acids (fish oil) 500 MG capsule capsule Take 2 capsules by mouth Daily With Breakfast. 180 each 3    Probiotic Product (PROBIOTIC DAILY) capsule Take 1 each by mouth Daily. 90 capsule 3    coenzyme Q10 100 MG capsule Take 1 capsule by mouth Daily. 90 capsule 3    hydrocortisone 1 % cream Apply 1 application topically to the appropriate area as directed 2 (Two) Times a Day. (Patient not taking: Reported on 2/12/2025) 30 g 0     No facility-administered medications prior to visit.       No opioid medication identified on active medication list. I have reviewed chart for other potential  high risk medication/s and harmful drug interactions in the elderly.        Aspirin is not on active medication list.  Aspirin  "use is not indicated based on review of current medical condition/s. Risk of harm outweighs potential benefits.  .    Patient Active Problem List   Diagnosis    Malignant neoplasm of breast    Hyperlipidemia    Insomnia    Osteopenia    Essential hypertension    Epigastric pain     Advance Care Planning   Advance Care Planning     Advance Directive is not on file.  ACP discussion was held with the patient during this visit. Patient does not have an advance directive, information provided.     Objective    Vitals:    25 0806   BP: 120/70   BP Location: Left arm   Patient Position: Sitting   Cuff Size: Adult   Pulse: 90   SpO2: 99%   Weight: 63.5 kg (140 lb)   Height: 152.4 cm (60\")   PainSc: 0-No pain     Estimated body mass index is 27.34 kg/m² as calculated from the following:    Height as of this encounter: 152.4 cm (60\").    Weight as of this encounter: 63.5 kg (140 lb).    BMI is >= 25 and <30. (Overweight) The following options were offered after discussion;: weight loss educational material (shared in after visit summary), exercise counseling/recommendations, and nutrition counseling/recommendations      Does the patient have evidence of cognitive impairment?   No    Lab Results   Component Value Date    TRIG 85 03/10/2025    HDL 69 (H) 03/10/2025     (H) 03/10/2025    VLDL 15 03/10/2025    HGBA1C 5.40 03/10/2025          HEALTH RISK ASSESSMENT    Smoking Status:  Social History     Tobacco Use   Smoking Status Never    Passive exposure: Never   Smokeless Tobacco Never     Alcohol Consumption:  Social History     Substance and Sexual Activity   Alcohol Use No     Fall Risk Screen:    STEADI Fall Risk Assessment was completed, and patient is at LOW risk for falls.Assessment completed on:2025    Depression Screenin/8/2025     8:11 AM   PHQ-2/PHQ-9 Depression Screening   Little interest or pleasure in doing things Not at all   Feeling down, depressed, or hopeless Not at all   How " difficult have these problems made it for you to do your work, take care of things at home, or get along with other people? Not difficult at all       Health Habits and Functional and Cognitive Screenin/7/2025    12:06 PM   Functional & Cognitive Status   Do you have difficulty preparing food and eating? No   Do you have difficulty bathing yourself, getting dressed or grooming yourself? No   Do you have difficulty using the toilet? No   Do you have difficulty moving around from place to place? No   Do you have trouble with steps or getting out of a bed or a chair? No   Current Diet Well Balanced Diet   Dental Exam Up to date   Eye Exam Up to date   Exercise (times per week) 4 times per week   Current Exercises Include Gardening;Walking;Yard Work   Do you need help using the phone?  No   Are you deaf or do you have serious difficulty hearing?  No   Do you need help to go to places out of walking distance? No   Do you need help shopping? No   Do you need help preparing meals?  No   Do you need help with housework?  No   Do you need help with laundry? No   Do you need help taking your medications? No   Do you need help managing money? No   Do you ever drive or ride in a car without wearing a seat belt? No   Have you felt unusual stress, anger or loneliness in the last month? Yes   Who do you live with? Other   If you need help, do you have trouble finding someone available to you? No   Have you been bothered in the last four weeks by sexual problems? No   Do you have difficulty concentrating, remembering or making decisions? No       Age-appropriate Screening Schedule:  Refer to the list below for future screening recommendations based on patient's age, sex and/or medical conditions. Orders for these recommended tests are listed in the plan section. The patient has been provided with a written plan.    Health Maintenance   Topic Date Due    ZOSTER VACCINE (1 of 2) Never done    DXA SCAN  2024    COVID-19  Vaccine (3 - 2024-25 season) 09/01/2024    ANNUAL WELLNESS VISIT  05/06/2025    INFLUENZA VACCINE  07/01/2025    LIPID PANEL  03/10/2026    MAMMOGRAM  03/11/2027    COLORECTAL CANCER SCREENING  01/21/2029    TDAP/TD VACCINES (3 - Td or Tdap) 08/25/2030    HEPATITIS C SCREENING  Completed    Pneumococcal Vaccine 50+  Completed                Physical Exam  Gen Appearance: NAD  HEENT: Normocephalic, PERRLA, no thyromegaly, trache midline  Heart: RRR, normal S1 and S2, no murmur  Lungs: CTA b/l, no wheezing, no crackles  Abdomen: Soft, non-tender, non-distended, no guarding and BSx4  MSK: Moves all extremities well, normal gait, no peripheral edema  Pulses: Palpable and equal b/l  Lymph nodes: No palpable lymphadenopathy   Neuro: No focal deficits     CMS Preventative Services Quick Reference  Risk Factors Identified During Encounter:    None Identified    The above risks/problems have been discussed with the patient.  Pertinent information has been shared with the patient in the After Visit Summary.    Diagnoses and all orders for this visit:    1. Medicare annual wellness visit, subsequent (Primary)  Counseled on healthy weight, nutrition, physical activity, cancer screening, and immunizations.    2. Hypertension, unspecified type    3. Pure hypercholesterolemia    4. Osteopenia of multiple sites    5. Anxiety  -     hydrOXYzine (ATARAX) 10 MG tablet; Take 1 tablet by mouth At Night As Needed for Anxiety.  Dispense: 30 tablet; Refill: 0    6. Postmenopausal  -     DEXA Bone Density Axial; Future        Follow Up:   Next Medicare Wellness visit to be scheduled in 1 year.      An After Visit Summary and PPPS were made available to the patient.

## 2025-05-12 DIAGNOSIS — Z00.00 PREVENTATIVE HEALTH CARE: Primary | ICD-10-CM

## 2025-05-12 RX ORDER — CETIRIZINE HYDROCHLORIDE 10 MG/1
10 TABLET ORAL DAILY
Qty: 90 TABLET | Refills: 0 | Status: SHIPPED | OUTPATIENT
Start: 2025-05-12

## 2025-05-12 NOTE — TELEPHONE ENCOUNTER
Requested Prescriptions     Pending Prescriptions Disp Refills    cetirizine (zyrTEC) 10 MG tablet [Pharmacy Med Name: CETIRIZINE 10MG TABLETS] 90 tablet 0     Sig: TAKE 1 TABLET BY MOUTH EVERY DAY

## 2025-06-10 ENCOUNTER — APPOINTMENT (OUTPATIENT)
Dept: GENERAL RADIOLOGY | Facility: HOSPITAL | Age: 70
End: 2025-06-10
Payer: MEDICARE

## 2025-06-10 ENCOUNTER — HOSPITAL ENCOUNTER (EMERGENCY)
Facility: HOSPITAL | Age: 70
Discharge: HOME | End: 2025-06-10
Attending: EMERGENCY MEDICINE | Admitting: EMERGENCY MEDICINE
Payer: MEDICARE

## 2025-06-10 VITALS
DIASTOLIC BLOOD PRESSURE: 90 MMHG | SYSTOLIC BLOOD PRESSURE: 151 MMHG | WEIGHT: 130 LBS | HEART RATE: 69 BPM | BODY MASS INDEX: 24.55 KG/M2 | RESPIRATION RATE: 16 BRPM | OXYGEN SATURATION: 97 % | HEIGHT: 61 IN | TEMPERATURE: 98.1 F

## 2025-06-10 DIAGNOSIS — S43.015A ANTERIOR DISLOCATION OF LEFT SHOULDER, INITIAL ENCOUNTER: Primary | ICD-10-CM

## 2025-06-10 PROCEDURE — 99285 EMERGENCY DEPT VISIT HI MDM: CPT

## 2025-06-10 PROCEDURE — 96374 THER/PROPH/DIAG INJ IV PUSH: CPT

## 2025-06-10 PROCEDURE — 73030 X-RAY EXAM OF SHOULDER: CPT

## 2025-06-10 PROCEDURE — 25010000002 PROPOFOL 10 MG/ML EMULSION: Performed by: EMERGENCY MEDICINE

## 2025-06-10 PROCEDURE — 25010000002 KETOROLAC TROMETHAMINE PER 15 MG: Performed by: EMERGENCY MEDICINE

## 2025-06-10 RX ORDER — PROPOFOL 10 MG/ML
150 VIAL (ML) INTRAVENOUS ONCE
Status: COMPLETED | OUTPATIENT
Start: 2025-06-10 | End: 2025-06-10

## 2025-06-10 RX ORDER — SODIUM CHLORIDE 0.9 % (FLUSH) 0.9 %
10 SYRINGE (ML) INJECTION AS NEEDED
Status: DISCONTINUED | OUTPATIENT
Start: 2025-06-10 | End: 2025-06-10 | Stop reason: HOSPADM

## 2025-06-10 RX ORDER — KETOROLAC TROMETHAMINE 15 MG/ML
15 INJECTION, SOLUTION INTRAMUSCULAR; INTRAVENOUS ONCE
Status: COMPLETED | OUTPATIENT
Start: 2025-06-10 | End: 2025-06-10

## 2025-06-10 RX ORDER — PROPOFOL 10 MG/ML
VIAL (ML) INTRAVENOUS
Status: COMPLETED | OUTPATIENT
Start: 2025-06-10 | End: 2025-06-10

## 2025-06-10 RX ADMIN — KETOROLAC TROMETHAMINE 15 MG: 15 INJECTION, SOLUTION INTRAMUSCULAR; INTRAVENOUS at 17:06

## 2025-06-10 RX ADMIN — PROPOFOL 50 MG: 10 INJECTION, EMULSION INTRAVENOUS at 17:14

## 2025-06-10 RX ADMIN — PROPOFOL 150 MG: 10 INJECTION, EMULSION INTRAVENOUS at 17:08

## 2025-06-10 RX ADMIN — PROPOFOL 20 MG: 10 INJECTION, EMULSION INTRAVENOUS at 17:16

## 2025-06-10 NOTE — ED PROVIDER NOTES
Subjective   History of Present Illness  69-year-old female presents for evaluation of left shoulder injury.  Just prior to coming to the emergency department, the patient had a mechanical trip and fall at home after tripping over a dog gate and fell awkwardly onto her left shoulder.  She felt immediate pain and was unable to move her left arm so she came here to the ED to be evaluated.  She is right-handed.  Isolated injury.  She did not hit her head or lose consciousness.  No neck pain.  She is not anticoagulated.  She currently rates her pain at 9 out of 10 in severity and notes that the pain is worse with attempted movement.  She has no other complaints at this time.      Review of Systems   Musculoskeletal:         Left shoulder pain   All other systems reviewed and are negative.      Past Medical History:   Diagnosis Date   • Abdominal pain    • Acute URI    • Breast cancer 2004    right   • Breast cancer    • Cyst of left upper eyelid    • Hx of radiation therapy    • Immunization deficiency    • Radiation 2004    right   • Seasonal allergies    • Sinusitis        Allergies   Allergen Reactions   • Venlafaxine Anxiety     And weakness.     • Amoxicillin    • Azithromycin    • Cephalexin    • Ciprofloxacin    • Clarithromycin    • Doxycycline    • Doxycycline Monohydrate    • Erythromycin    • Levofloxacin    • Loratadine    • Bactrim [Sulfamethoxazole-Trimethoprim] Rash       Past Surgical History:   Procedure Laterality Date   • BASAL CELL CARCINOMA EXCISION     • BREAST BIOPSY Right 2004    stereo bx   • BREAST LUMPECTOMY Right 2004   • BREAST SURGERY     • HYSTERECTOMY  2004   • OOPHORECTOMY         Family History   Problem Relation Age of Onset   • Lung cancer Mother    • Hypertension Father    • Diabetes Father    • COPD Brother    • Breast cancer Neg Hx    • Ovarian cancer Neg Hx        Social History     Socioeconomic History   • Marital status:    Tobacco Use   • Smoking status: Never      Passive exposure: Never   • Smokeless tobacco: Never   Vaping Use   • Vaping status: Never Used   Substance and Sexual Activity   • Alcohol use: No   • Drug use: No   • Sexual activity: Yes     Partners: Male     Birth control/protection: None     Comment:            Objective   Physical Exam  Vitals and nursing note reviewed.   Constitutional:       General: She is not in acute distress.     Appearance: She is well-developed. She is not diaphoretic.      Comments: Nontoxic-appearing female   HENT:      Head: Normocephalic and atraumatic.   Eyes:      Pupils: Pupils are equal, round, and reactive to light.   Neck:      Comments: No midline cervical spine tenderness noted, no step-off or deformity present  Cardiovascular:      Rate and Rhythm: Normal rate and regular rhythm.      Heart sounds: Normal heart sounds. No murmur heard.     No friction rub. No gallop.   Pulmonary:      Effort: Pulmonary effort is normal. No respiratory distress.      Breath sounds: Normal breath sounds. No wheezing or rales.   Musculoskeletal:      Comments: Range of motion of left shoulder is limited secondary to pain, left upper extremity held in flexed position of comfort   Skin:     General: Skin is warm and dry.      Findings: No erythema or rash.   Neurological:      Mental Status: She is alert and oriented to person, place, and time.      Comments: Left upper extremity is neurovascularly intact distally with bounding distal pulses and normal sensation noted, normal  strength present   Psychiatric:         Mood and Affect: Mood normal.         Thought Content: Thought content normal.         Judgment: Judgment normal.         Upper Extremity Dislocation    Date/Time: 6/10/2025 6:46 PM    Performed by: Genaro Wu MD  Authorized by: Genaro Wu MD  Consent: Verbal consent obtained. Written consent obtained  Risks and benefits: risks, benefits and alternatives were discussed  Consent given by:  "patient  Patient understanding: patient states understanding of the procedure being performed  Patient consent: the patient's understanding of the procedure matches consent given  Procedure consent: procedure consent matches procedure scheduled  Relevant documents: relevant documents present and verified  Test results: test results available and properly labeled  Site marked: the operative site was marked  Imaging studies: imaging studies available  Patient identity confirmed: verbally with patient and arm band  Time out: Immediately prior to procedure a \"time out\" was called to verify the correct patient, procedure, equipment, support staff and site/side marked as required.  Injury location: shoulder  Location details: left shoulder  Injury type: dislocation  Dislocation type: anterior  Hill-Sachs deformity: no  Chronicity: new  Pre-procedure distal perfusion: normal  Pre-procedure neurological function: normal  Pre-procedure range of motion: reduced    Anesthesia:  Local anesthesia used: no    Sedation:  Patient sedated: yes  Sedation type: moderate (conscious) sedation  Sedatives: propofol  Sedation start date/time: 6/10/2025 5:13 PM  Sedation end date/time: 6/10/2025 5:30 PM  Vitals: Vital signs were monitored during sedation.    Manipulation performed: yes  Reduction method: external rotation  Reduction successful: yes  X-ray confirmed reduction: yes  Immobilization: sling  Post-procedure neurovascular assessment: post-procedure neurovascularly intact  Post-procedure distal perfusion: normal  Post-procedure neurological function: normal  Post-procedure range of motion: improved  Patient tolerance: patient tolerated the procedure well with no immediate complications      Procedural Sedation    Date/Time: 6/10/2025 6:47 PM    Performed by: Genaro Wu MD  Authorized by: Genaro Wu MD    Consent:     Consent obtained:  Verbal and written    Consent given by:  Parent and patient    Risks, " benefits, and alternatives were discussed: yes      Risks discussed:  Allergic reaction, dysrhythmia, inadequate sedation, nausea, vomiting, prolonged hypoxia resulting in organ damage and respiratory compromise necessitating ventilatory assistance and intubation  Universal protocol:     Procedure explained and questions answered to patient or proxy's satisfaction: yes      Relevant documents present and verified: yes      Test results available: yes      Imaging studies available: yes      Site/side marked: yes      Immediately prior to procedure, a time out was called: yes      Patient identity confirmed:  Verbally with patient and arm band  Indications:     Procedure performed:  Dislocation reduction    Procedure necessitating sedation performed by:  Physician performing sedation    Intended level of sedation:  Moderate  Pre-sedation assessment:     ASA classification: class 2 - patient with mild systemic disease      Mallampati score:  II - soft palate, uvula, fauces visible    Neck mobility: normal      Pre-sedation assessments completed and reviewed: airway patency, anesthesia/sedation history, cardiovascular function, hydration status, mental status, nausea/vomiting, pain level, respiratory function and temperature      Pre-sedation assessment completed:  6/10/2025 5:01 PM  Immediate pre-procedure details:     Reassessment: Patient reassessed immediately prior to procedure      Reviewed: vital signs, relevant labs/tests and NPO status      Verified: bag valve mask available, emergency equipment available, intubation equipment available, IV patency confirmed and oxygen available    Procedure details (see MAR for exact dosages):     Sedation start time:  6/10/2025 5:13 PM    Preoxygenation:  Nonrebreather mask    Sedation:  Propofol    Analgesia:  None    Intra-procedure monitoring:  Blood pressure monitoring, cardiac monitor, continuous pulse oximetry, continuous capnometry, frequent LOC assessments and  frequent vital sign checks    Intra-procedure events: none      Sedation end time:  6/10/2025 5:30 PM    Total sedation time (minutes):  17  Post-procedure details:     Post-sedation assessment completed:  6/10/2025 5:51 PM    Attendance: Constant attendance by certified staff until patient recovered      Recovery: Patient returned to pre-procedure baseline      Estimated blood loss (see I/O flowsheets): no      Complications:  N/a    Post-sedation assessments completed and reviewed: airway patency, cardiovascular function, hydration status, mental status, nausea/vomiting, pain level and respiratory function      Specimens recovered:  None    Patient is stable for discharge or admission: yes      Procedure completion:  Tolerated well, no immediate complications             ED Course  ED Course as of 06/10/25 1849   Tue Billy 10, 2025   5134 69-year-old female presents for evaluation of left shoulder injury.  Just prior to coming to the emergency department, the patient had a mechanical trip and fall at home after tripping over a dog gate and fell awkwardly onto her left shoulder.  She felt immediate pain and was unable to move her left arm so she came here to the ED to be evaluated.  She is right-handed.  She did not hit her head or lose consciousness.  She is not anticoagulated.  No neck pain.  She is complaining of isolated pain to her left shoulder and denies any paresthesias.  On arrival to the ED, the patient has limited range of motion of her left shoulder.  Clinical presentation is highly concerning for dislocation.  We will obtain plain films, provide pain control, and we will reassess following initial interventions. [DD]   1704 I personally and independently viewed the patient's x-ray images myself, and I am in agreement with the radiologist's reading for final interpretation, particularly regarding left anterior shoulder dislocation. [DD]   1705 We will obtain informed consent and we will proceed with  sedation and closed reduction of left shoulder dislocation. [DD]   1728 After obtaining informed consent, using propofol for sedation, the patient's shoulder dislocation was reduced without complication.  She tolerated the procedure well.  Neurovascularly intact following procedure.  Sling placed for comfort. [DD]   1729 Patient reassured.  I have referred her to Dr. Espinoza with orthopedic surgery, and she will follow-up within the next week.  Agreeable with plan and given appropriate strict return precautions. [DD]   1750 I personally and independently viewed the patient's x-ray images myself, and I am in agreement with the radiologist's reading for final interpretation, particularly regarding adequate reduction of left anterior shoulder dislocation.  Hill-Sachs lesion noted. [DD]      ED Course User Index  [DD] Genaro Wu MD                                         No results found for this or any previous visit (from the past 24 hours).  Note: In addition to lab results from this visit, the labs listed above may include labs taken at another facility or during a different encounter within the last 24 hours. Please correlate lab times with ED admission and discharge times for further clarification of the services performed during this visit.    XR Shoulder 2+ View Left   Final Result   Impression:   Successful reduction of shoulder dislocation. Hill-Sachs lesion is noted.         Electronically Signed: Vinicio Magallanes MD     6/10/2025 6:03 PM EDT     Workstation ID: [id number]      XR Shoulder 2+ View Left   Final Result   Impression:   Anterior shoulder dislocation.         Electronically Signed: Vinicio Magallanes MD     6/10/2025 4:42 PM EDT     Workstation ID: [id number]        Vitals:    06/10/25 1612 06/10/25 1700 06/10/25 1730 06/10/25 1800   BP: 149/99  (!) 145/103 151/90   BP Location: Right arm      Patient Position: Standing      Pulse: 97 76 82 69   Resp: 16      Temp: 98.1 °F (36.7 °C)      TempSrc:  "Oral      SpO2: 95%  99% 97%   Weight: 59 kg (130 lb)      Height: 154.9 cm (61\")        Medications   sodium chloride 0.9 % flush 10 mL (has no administration in time range)   propofol (DIPRIVAN) injection 150 mg (150 mg Intravenous Given by Other 6/10/25 1708)   ketorolac (TORADOL) injection 15 mg (15 mg Intravenous Given 6/10/25 1706)   propofol (DIPRIVAN) infusion 10 mg/mL 100 mL ( Intravenous Canceled Entry 6/10/25 1714)   propofol (DIPRIVAN) injection (20 mg Intravenous Given 6/10/25 1716)     ECG/EMG Results (last 24 hours)       ** No results found for the last 24 hours. **          No orders to display                     Medical Decision Making  Problems Addressed:  Anterior dislocation of left shoulder, initial encounter: complicated acute illness or injury    Amount and/or Complexity of Data Reviewed  Radiology: ordered.    Risk  Prescription drug management.        Final diagnoses:   Anterior dislocation of left shoulder, initial encounter       ED Disposition  ED Disposition       ED Disposition   Discharge    Condition   Stable    Comment   --               Bernard Espinoza MD  [address] [place] [address]  LIS[facility] [address] [place] [address]  [phone]    In 1 week           Medication List      No changes were made to your prescriptions during this visit.            Genaro Wu MD  06/10/25 7824    "

## 2025-07-17 ENCOUNTER — OFFICE VISIT (OUTPATIENT)
Dept: FAMILY MEDICINE CLINIC | Facility: CLINIC | Age: 70
End: 2025-07-17
Payer: MEDICARE

## 2025-07-17 ENCOUNTER — LAB (OUTPATIENT)
Dept: LAB | Facility: HOSPITAL | Age: 70
End: 2025-07-17
Payer: MEDICARE

## 2025-07-17 VITALS
WEIGHT: 135.8 LBS | BODY MASS INDEX: 25.64 KG/M2 | HEART RATE: 93 BPM | HEIGHT: 61 IN | OXYGEN SATURATION: 97 % | DIASTOLIC BLOOD PRESSURE: 78 MMHG | SYSTOLIC BLOOD PRESSURE: 130 MMHG

## 2025-07-17 DIAGNOSIS — R10.84 GENERALIZED ABDOMINAL PAIN: Primary | ICD-10-CM

## 2025-07-17 DIAGNOSIS — R14.0 BLOATING: ICD-10-CM

## 2025-07-17 DIAGNOSIS — K30 UPSET STOMACH: ICD-10-CM

## 2025-07-17 DIAGNOSIS — R10.84 GENERALIZED ABDOMINAL PAIN: ICD-10-CM

## 2025-07-17 LAB
ALBUMIN SERPL-MCNC: 4.5 G/DL (ref 3.5–5.2)
ALBUMIN/GLOB SERPL: 1.5 G/DL
ALP SERPL-CCNC: 75 U/L (ref 39–117)
ALT SERPL W P-5'-P-CCNC: 15 U/L (ref 1–33)
ANION GAP SERPL CALCULATED.3IONS-SCNC: 12 MMOL/L (ref 5–15)
AST SERPL-CCNC: 22 U/L (ref 1–32)
BASOPHILS # BLD AUTO: 0.04 10*3/MM3 (ref 0–0.2)
BASOPHILS NFR BLD AUTO: 0.7 % (ref 0–1.5)
BILIRUB SERPL-MCNC: 0.4 MG/DL (ref 0–1.2)
BILIRUB UR QL STRIP: NEGATIVE
BUN SERPL-MCNC: 8 MG/DL (ref 8–23)
BUN/CREAT SERPL: 10.7 (ref 7–25)
CALCIUM SPEC-SCNC: 9.3 MG/DL (ref 8.6–10.5)
CHLORIDE SERPL-SCNC: 98 MMOL/L (ref 98–107)
CLARITY UR: CLEAR
CO2 SERPL-SCNC: 25 MMOL/L (ref 22–29)
COLOR UR: YELLOW
CREAT SERPL-MCNC: 0.75 MG/DL (ref 0.57–1)
CRP SERPL-MCNC: <0.3 MG/DL (ref 0–0.5)
DEPRECATED RDW RBC AUTO: 44.4 FL (ref 37–54)
EGFRCR SERPLBLD CKD-EPI 2021: 87.4 ML/MIN/1.73
EOSINOPHIL # BLD AUTO: 0.02 10*3/MM3 (ref 0–0.4)
EOSINOPHIL NFR BLD AUTO: 0.3 % (ref 0.3–6.2)
ERYTHROCYTE [DISTWIDTH] IN BLOOD BY AUTOMATED COUNT: 13.1 % (ref 12.3–15.4)
GLOBULIN UR ELPH-MCNC: 3 GM/DL
GLUCOSE SERPL-MCNC: 104 MG/DL (ref 65–99)
GLUCOSE UR STRIP-MCNC: NEGATIVE MG/DL
HCT VFR BLD AUTO: 46.4 % (ref 34–46.6)
HGB BLD-MCNC: 14.7 G/DL (ref 12–15.9)
HGB UR QL STRIP.AUTO: NEGATIVE
HOLD SPECIMEN: NORMAL
IMM GRANULOCYTES # BLD AUTO: 0.01 10*3/MM3 (ref 0–0.05)
IMM GRANULOCYTES NFR BLD AUTO: 0.2 % (ref 0–0.5)
KETONES UR QL STRIP: NEGATIVE
LEUKOCYTE ESTERASE UR QL STRIP.AUTO: NEGATIVE
LIPASE SERPL-CCNC: 37 U/L (ref 13–60)
LYMPHOCYTES # BLD AUTO: 1.67 10*3/MM3 (ref 0.7–3.1)
LYMPHOCYTES NFR BLD AUTO: 27.5 % (ref 19.6–45.3)
MCH RBC QN AUTO: 28.9 PG (ref 26.6–33)
MCHC RBC AUTO-ENTMCNC: 31.7 G/DL (ref 31.5–35.7)
MCV RBC AUTO: 91.3 FL (ref 79–97)
MONOCYTES # BLD AUTO: 0.46 10*3/MM3 (ref 0.1–0.9)
MONOCYTES NFR BLD AUTO: 7.6 % (ref 5–12)
NEUTROPHILS NFR BLD AUTO: 3.87 10*3/MM3 (ref 1.7–7)
NEUTROPHILS NFR BLD AUTO: 63.7 % (ref 42.7–76)
NITRITE UR QL STRIP: NEGATIVE
NRBC BLD AUTO-RTO: 0 /100 WBC (ref 0–0.2)
PH UR STRIP.AUTO: 7.5 [PH] (ref 5–8)
PLATELET # BLD AUTO: 315 10*3/MM3 (ref 140–450)
PMV BLD AUTO: 10.9 FL (ref 6–12)
POTASSIUM SERPL-SCNC: 4.5 MMOL/L (ref 3.5–5.2)
PROT SERPL-MCNC: 7.5 G/DL (ref 6–8.5)
PROT UR QL STRIP: NEGATIVE
RBC # BLD AUTO: 5.08 10*6/MM3 (ref 3.77–5.28)
SODIUM SERPL-SCNC: 135 MMOL/L (ref 136–145)
SP GR UR STRIP: 1.01 (ref 1–1.03)
UROBILINOGEN UR QL STRIP: NORMAL
WBC NRBC COR # BLD AUTO: 6.07 10*3/MM3 (ref 3.4–10.8)

## 2025-07-17 PROCEDURE — 85025 COMPLETE CBC W/AUTO DIFF WBC: CPT

## 2025-07-17 PROCEDURE — 86140 C-REACTIVE PROTEIN: CPT

## 2025-07-17 PROCEDURE — 83690 ASSAY OF LIPASE: CPT

## 2025-07-17 PROCEDURE — 80053 COMPREHEN METABOLIC PANEL: CPT

## 2025-07-17 PROCEDURE — 36415 COLL VENOUS BLD VENIPUNCTURE: CPT

## 2025-07-17 PROCEDURE — 81003 URINALYSIS AUTO W/O SCOPE: CPT

## 2025-07-17 NOTE — PROGRESS NOTES
Chief Complaint   Patient presents with    Bloated     Off and on for 2 to 3 months. Dull pain after eating and gets bloated. PT states they did have sharp pain in the side that woke them from sleep         HPI:  Ester Hernandez is a 69 y.o. female who presents today for lower abdominal pain, bloating and upset stomach after eating.  Pain woke patient up in the middle the night last week.    ROS:  Constitutional: no fevers, night sweats or unexplained weight loss  Eyes: no vision changes  ENT: no runny nose, ear pain, sore throat  Cardio: no chest pain, palpitations  Pulm: no shortness of breath, wheezing, or cough  GI: no abdominal pain or changes in bowel movements  : no difficulty urinating  MSK: no difficulty ambulating, no joint pain  Neuro: no weakness, dizziness or headache  Psych: no trouble sleeping  Endo: no change in appetite      Past Medical History:   Diagnosis Date    Abdominal pain     Acute URI     Breast cancer 2004    right    Breast cancer     Cyst of left upper eyelid     Hx of radiation therapy     Immunization deficiency     Radiation 2004    right    Seasonal allergies     Sinusitis       Family History   Problem Relation Age of Onset    Lung cancer Mother     Hypertension Father     Diabetes Father     COPD Brother     Breast cancer Neg Hx     Ovarian cancer Neg Hx       Social History     Socioeconomic History    Marital status:    Tobacco Use    Smoking status: Never     Passive exposure: Never    Smokeless tobacco: Never   Vaping Use    Vaping status: Never Used   Substance and Sexual Activity    Alcohol use: Yes     Alcohol/week: 1.0 standard drink of alcohol     Types: 1 Drinks containing 0.5 oz of alcohol per week     Comment: rarely    Drug use: No    Sexual activity: Yes     Partners: Male     Birth control/protection: None     Comment:       Allergies   Allergen Reactions    Venlafaxine Anxiety     And weakness.      Amoxicillin     Azithromycin     Cephalexin      Ciprofloxacin     Clarithromycin     Doxycycline     Doxycycline Monohydrate     Erythromycin     Levofloxacin     Loratadine     Bactrim [Sulfamethoxazole-Trimethoprim] Rash      Immunization History   Administered Date(s) Administered    COVID-19 (PFIZER) Purple Cap Monovalent 03/23/2021, 04/24/2021    Flu Vaccine Quad PF >36MO 01/09/2019, 11/09/2020, 09/11/2021    Fluzone  >6mos 09/30/2010, 01/15/2018    Fluzone (or Fluarix & Flulaval for VFC) >6mos 01/09/2019, 11/09/2020, 09/11/2021    Fluzone High-Dose 65+YRS 10/03/2024    Fluzone High-Dose 65+yrs 01/04/2024    Fluzone Quad >6mos (Multi-dose) 11/03/2016, 01/15/2018    Influenza Injectable Mdck Pf Quad 09/28/2022    Influenza, Unspecified 09/28/2022    Pneumococcal Conjugate 20-Valent (PCV20) 04/28/2023    Tdap 12/20/2010, 08/25/2020        PE:  Vitals:    07/17/25 0748   BP: 130/78   Pulse: 93   SpO2: 97%      Body mass index is 25.66 kg/m².    Gen Appearance: NAD  HEENT: Normocephalic, PERRLA, no thyromegaly, trache midline  Heart: RRR, normal S1 and S2, no murmur  Lungs: CTA b/l, no wheezing, no crackles  Abdomen: Soft, non-tender, non-distended, no guarding and BSx4  MSK: Moves all extremities well, normal gait, no peripheral edema  Pulses: Palpable and equal b/l  Lymph nodes: No palpable lymphadenopathy   Neuro: No focal deficits      Current Outpatient Medications   Medication Sig Dispense Refill    amitriptyline (ELAVIL) 10 MG tablet Take 2 tablets by mouth Every Night. 180 tablet 3    cetirizine (zyrTEC) 10 MG tablet TAKE 1 TABLET BY MOUTH EVERY DAY 90 tablet 0    cholecalciferol (VITAMIN D3) 25 MCG (1000 UT) tablet Take 1 tablet by mouth Daily. 90 tablet 3    lisinopril (PRINIVIL,ZESTRIL) 20 MG tablet TAKE 1 TABLET BY MOUTH DAILY 90 tablet 1    Omega-3 Fatty Acids (fish oil) 500 MG capsule capsule Take 1 capsule by mouth 2 (Two) Times a Day With Meals. (Patient taking differently: Take 1 capsule by mouth Daily With Breakfast.) 180 capsule 3    Probiotic  Product (PROBIOTIC DAILY) capsule Take 1 each by mouth Daily. 90 capsule 3    coenzyme Q10 100 MG capsule Take 1 capsule by mouth Daily. 90 capsule 3    hydrocortisone 1 % cream Apply 1 application topically to the appropriate area as directed 2 (Two) Times a Day. 30 g 0    hydrOXYzine (ATARAX) 10 MG tablet Take 1 tablet by mouth At Night As Needed for Anxiety. (Patient not taking: Reported on 7/17/2025) 30 tablet 0    Omega-3 Fatty Acids (fish oil) 500 MG capsule capsule Take 2 capsules by mouth Daily With Breakfast. 180 each 3     No current facility-administered medications for this visit.      Discussed low FODMAP diet.  Ongoing stressors may be worsening IBS symptoms.  Recommend establishing care with gastroenterology.  Checking CT scan to further evaluate pain.    Counseling was given to patient and family for the following topics: diagnostic results, impressions, and risks and benefits of treatment options . Total time of the encounter was 40 minutes and 20 minutes was spent face to face counseling.    Diagnoses and all orders for this visit:    1. Generalized abdominal pain (Primary)  -     CT Abdomen Pelvis With Contrast; Future  -     Ambulatory Referral to Gastroenterology  -     CBC & Differential; Future  -     Comprehensive Metabolic Panel; Future  -     Urinalysis With Culture If Indicated -; Future  -     C-reactive protein; Future  -     Lipase; Future    2. Bloating  -     CT Abdomen Pelvis With Contrast; Future  -     Ambulatory Referral to Gastroenterology  -     CBC & Differential; Future  -     Comprehensive Metabolic Panel; Future  -     Urinalysis With Culture If Indicated -; Future  -     C-reactive protein; Future  -     Lipase; Future    3. Upset stomach  -     CT Abdomen Pelvis With Contrast; Future  -     Ambulatory Referral to Gastroenterology  -     CBC & Differential; Future  -     Comprehensive Metabolic Panel; Future  -     Urinalysis With Culture If Indicated -; Future  -      C-reactive protein; Future  -     Lipase; Future         No follow-ups on file.     Dictated Utilizing Dragon Dictation    Please note that portions of this note were completed with a voice recognition program.    Part of this note may be an electronic transcription/translation of spoken language to printed text using the Dragon Dictation System.

## 2025-07-18 ENCOUNTER — TELEPHONE (OUTPATIENT)
Dept: FAMILY MEDICINE CLINIC | Facility: CLINIC | Age: 70
End: 2025-07-18
Payer: MEDICARE

## 2025-07-18 NOTE — TELEPHONE ENCOUNTER
Name: Ester Hernandez      Relationship: Self      Best Callback Number:   Telephone Information:   Mobile [phone]       HUB PROVIDED THE RELAY MESSAGE FROM THE OFFICE      PATIENT: VOICED UNDERSTANDING AND HAS NO FURTHER QUESTIONS AT THIS TIME    ADDITIONAL INFORMATION:

## 2025-07-18 NOTE — TELEPHONE ENCOUNTER
Caller: Ester Hernandez    Relationship: Self    Best call back number: [phone]     What test was performed: LABS    When was the test performed: 7/17    Where was the test performed: IN OFFICE    Additional notes: PLEASE CALL PATIENT WHEN RESULTS HAVE BEEN RECEIVED AND REVIEWED.

## 2025-07-18 NOTE — TELEPHONE ENCOUNTER
Unable to reach Ester Hernandez by phone or leave message.    Hub may relay message and document.    Note      Lab results are normal, no signs of infection or inflammation.  Will let you know once we have CT scan results.

## 2025-07-18 NOTE — TELEPHONE ENCOUNTER
Lab results are normal, no signs of infection or inflammation.  Will let you know once we have CT scan results.

## 2025-07-24 ENCOUNTER — HOSPITAL ENCOUNTER (OUTPATIENT)
Facility: HOSPITAL | Age: 70
Discharge: HOME | End: 2025-07-24
Admitting: INTERNAL MEDICINE
Payer: MEDICARE

## 2025-07-24 DIAGNOSIS — R10.84 GENERALIZED ABDOMINAL PAIN: ICD-10-CM

## 2025-07-24 DIAGNOSIS — R14.0 BLOATING: ICD-10-CM

## 2025-07-24 DIAGNOSIS — K30 UPSET STOMACH: ICD-10-CM

## 2025-07-24 PROCEDURE — 74177 CT ABD & PELVIS W/CONTRAST: CPT

## 2025-07-24 PROCEDURE — 25510000001 IOPAMIDOL 61 % SOLUTION: Performed by: INTERNAL MEDICINE

## 2025-07-24 RX ORDER — IOPAMIDOL 612 MG/ML
100 INJECTION, SOLUTION INTRAVASCULAR
Status: COMPLETED | OUTPATIENT
Start: 2025-07-24 | End: 2025-07-24

## 2025-07-24 RX ADMIN — IOPAMIDOL 80 ML: 612 INJECTION, SOLUTION INTRAVENOUS at 17:23

## 2025-07-25 ENCOUNTER — TELEPHONE (OUTPATIENT)
Dept: FAMILY MEDICINE CLINIC | Facility: CLINIC | Age: 70
End: 2025-07-25
Payer: MEDICARE

## 2025-07-25 NOTE — TELEPHONE ENCOUNTER
Caller: Ester Hernandez    Relationship: Self    Best call back number:  [phone]     Caller requesting test results: YES    What test was performed: CT SCAN    When was the test performed:     Where was the test performed: 07/24/2025    Additional notes: PLEASE CALL PATIENT WITH TEST RESULT

## 2025-07-28 ENCOUNTER — TELEPHONE (OUTPATIENT)
Dept: FAMILY MEDICINE CLINIC | Facility: CLINIC | Age: 70
End: 2025-07-28
Payer: MEDICARE

## 2025-07-28 NOTE — TELEPHONE ENCOUNTER
Caller: Ester Hernandez    Relationship: Self    Best call back number: [phone]     Caller requesting test results: PATIENT    What test was performed: CT SCAN    When was the test performed: THURSDAY    Where was the test performed: [place] [facility]    Additional notes:

## 2025-07-28 NOTE — TELEPHONE ENCOUNTER
Name: Ester Hernandez      Relationship: Self      Best Callback Number: [phone]       HUB PROVIDED THE RELAY MESSAGE FROM THE OFFICE      PATIENT: HAS FURTHER QUESTIONS AND WOULD LIKE A CALL BACK AT THE FOLLOWING PHONE [phone]    ADDITIONAL INFORMATION:

## 2025-07-29 ENCOUNTER — TELEPHONE (OUTPATIENT)
Dept: FAMILY MEDICINE CLINIC | Facility: CLINIC | Age: 70
End: 2025-07-29
Payer: MEDICARE

## 2025-07-29 NOTE — TELEPHONE ENCOUNTER
Caller: Ester Hernandez    Relationship: Self    Best call back number:   Telephone Information:   Mobile [phone]     What is the best time to reach you: ANYTIME    Who are you requesting to speak with (clinical staff, provider,  specific staff member): PCP OR MA    Do you know the name of the person who called:     What was the call regarding: PATIENT CALLED IN REQUESTING A CALLBACK REGARDING HER CT SCAN OF THE ABDOMEN    Is it okay if the provider responds through MyChart: PREFERS CALL

## 2025-08-01 DIAGNOSIS — K30 UPSET STOMACH: Primary | ICD-10-CM

## 2025-08-01 DIAGNOSIS — K58.9 IRRITABLE BOWEL SYNDROME, UNSPECIFIED TYPE: ICD-10-CM

## 2025-08-06 DIAGNOSIS — K42.9 UMBILICAL HERNIA WITHOUT OBSTRUCTION AND WITHOUT GANGRENE: Primary | ICD-10-CM

## 2025-08-18 DIAGNOSIS — Z00.00 PREVENTATIVE HEALTH CARE: ICD-10-CM

## 2025-08-18 DIAGNOSIS — E03.9 HYPOTHYROIDISM, UNSPECIFIED TYPE: ICD-10-CM

## 2025-08-19 RX ORDER — LISINOPRIL 20 MG/1
20 TABLET ORAL DAILY
Qty: 90 TABLET | Refills: 1 | Status: SHIPPED | OUTPATIENT
Start: 2025-08-19

## 2025-08-19 RX ORDER — CETIRIZINE HYDROCHLORIDE 10 MG/1
10 TABLET ORAL DAILY
Qty: 90 TABLET | Refills: 0 | Status: SHIPPED | OUTPATIENT
Start: 2025-08-19

## 2025-08-19 RX ORDER — LISINOPRIL 20 MG/1
20 TABLET ORAL DAILY
Qty: 90 TABLET | Refills: 1 | OUTPATIENT
Start: 2025-08-19

## 2025-08-21 DIAGNOSIS — E03.9 HYPOTHYROIDISM, UNSPECIFIED TYPE: ICD-10-CM

## 2025-08-28 RX ORDER — LISINOPRIL 20 MG/1
20 TABLET ORAL DAILY
Qty: 90 TABLET | Refills: 1 | Status: SHIPPED | OUTPATIENT
Start: 2025-08-28